# Patient Record
Sex: MALE | Race: WHITE | NOT HISPANIC OR LATINO | Employment: FULL TIME | URBAN - METROPOLITAN AREA
[De-identification: names, ages, dates, MRNs, and addresses within clinical notes are randomized per-mention and may not be internally consistent; named-entity substitution may affect disease eponyms.]

---

## 2017-05-02 ENCOUNTER — AMBULATORY - HEALTHEAST (OUTPATIENT)
Dept: FAMILY MEDICINE | Facility: CLINIC | Age: 37
End: 2017-05-02

## 2017-05-02 ENCOUNTER — RECORDS - HEALTHEAST (OUTPATIENT)
Dept: GENERAL RADIOLOGY | Facility: CLINIC | Age: 37
End: 2017-05-02

## 2017-05-02 ENCOUNTER — OFFICE VISIT - HEALTHEAST (OUTPATIENT)
Dept: FAMILY MEDICINE | Facility: CLINIC | Age: 37
End: 2017-05-02

## 2017-05-02 DIAGNOSIS — E66.9 DIABETES MELLITUS TYPE 2 IN OBESE: ICD-10-CM

## 2017-05-02 DIAGNOSIS — R10.32 LEFT LOWER QUADRANT PAIN: ICD-10-CM

## 2017-05-02 DIAGNOSIS — E11.69 DIABETES MELLITUS TYPE 2 IN OBESE: ICD-10-CM

## 2017-05-02 LAB — HBA1C MFR BLD: 10.8 % (ref 3.5–6.1)

## 2017-05-03 ENCOUNTER — AMBULATORY - HEALTHEAST (OUTPATIENT)
Dept: FAMILY MEDICINE | Facility: CLINIC | Age: 37
End: 2017-05-03

## 2017-05-03 DIAGNOSIS — Z79.899 MEDICATION MANAGEMENT: ICD-10-CM

## 2017-05-04 ENCOUNTER — COMMUNICATION - HEALTHEAST (OUTPATIENT)
Dept: FAMILY MEDICINE | Facility: CLINIC | Age: 37
End: 2017-05-04

## 2017-05-05 ENCOUNTER — AMBULATORY - HEALTHEAST (OUTPATIENT)
Dept: FAMILY MEDICINE | Facility: CLINIC | Age: 37
End: 2017-05-05

## 2017-05-05 DIAGNOSIS — E11.69 DIABETES MELLITUS TYPE 2 IN OBESE: ICD-10-CM

## 2017-05-05 DIAGNOSIS — E66.9 DIABETES MELLITUS TYPE 2 IN OBESE: ICD-10-CM

## 2017-05-16 ENCOUNTER — OFFICE VISIT - HEALTHEAST (OUTPATIENT)
Dept: NURSING | Facility: CLINIC | Age: 37
End: 2017-05-16

## 2017-05-16 DIAGNOSIS — E11.69 DIABETES MELLITUS TYPE 2 IN OBESE: ICD-10-CM

## 2017-05-16 DIAGNOSIS — S93.409S SPRAIN OF ANKLE, UNSPECIFIED LATERALITY, UNSPECIFIED LIGAMENT, SEQUELA: ICD-10-CM

## 2017-05-16 DIAGNOSIS — Z91.09 OTHER ALLERGY, OTHER THAN TO MEDICINAL AGENTS: ICD-10-CM

## 2017-05-16 DIAGNOSIS — E66.9 DIABETES MELLITUS TYPE 2 IN OBESE: ICD-10-CM

## 2017-05-16 RX ORDER — NAPROXEN SODIUM 220 MG
440 TABLET ORAL AT BEDTIME
Status: SHIPPED | COMMUNITY
Start: 2017-05-16 | End: 2022-06-21 | Stop reason: DRUGHIGH

## 2017-05-23 ENCOUNTER — AMBULATORY - HEALTHEAST (OUTPATIENT)
Dept: EDUCATION SERVICES | Facility: CLINIC | Age: 37
End: 2017-05-23

## 2017-05-24 ENCOUNTER — COMMUNICATION - HEALTHEAST (OUTPATIENT)
Dept: FAMILY MEDICINE | Facility: CLINIC | Age: 37
End: 2017-05-24

## 2017-05-31 ENCOUNTER — RECORDS - HEALTHEAST (OUTPATIENT)
Dept: GENERAL RADIOLOGY | Facility: CLINIC | Age: 37
End: 2017-05-31

## 2017-05-31 ENCOUNTER — OFFICE VISIT - HEALTHEAST (OUTPATIENT)
Dept: FAMILY MEDICINE | Facility: CLINIC | Age: 37
End: 2017-05-31

## 2017-05-31 ENCOUNTER — COMMUNICATION - HEALTHEAST (OUTPATIENT)
Dept: FAMILY MEDICINE | Facility: CLINIC | Age: 37
End: 2017-05-31

## 2017-05-31 DIAGNOSIS — S99.911A UNSPECIFIED INJURY OF RIGHT ANKLE, INITIAL ENCOUNTER: ICD-10-CM

## 2017-05-31 DIAGNOSIS — S99.911A ANKLE INJURY, RIGHT, INITIAL ENCOUNTER: ICD-10-CM

## 2017-06-07 ENCOUNTER — COMMUNICATION - HEALTHEAST (OUTPATIENT)
Dept: FAMILY MEDICINE | Facility: CLINIC | Age: 37
End: 2017-06-07

## 2017-06-07 DIAGNOSIS — E11.69 DIABETES MELLITUS TYPE 2 IN OBESE: ICD-10-CM

## 2017-06-07 DIAGNOSIS — E66.9 DIABETES MELLITUS TYPE 2 IN OBESE: ICD-10-CM

## 2017-06-07 RX ORDER — GLUCOSAMINE HCL/CHONDROITIN SU 500-400 MG
1 CAPSULE ORAL
Qty: 600 EACH | Refills: 3 | Status: SHIPPED | OUTPATIENT
Start: 2017-06-07

## 2017-06-09 ENCOUNTER — COMMUNICATION - HEALTHEAST (OUTPATIENT)
Dept: FAMILY MEDICINE | Facility: CLINIC | Age: 37
End: 2017-06-09

## 2017-06-26 ENCOUNTER — AMBULATORY - HEALTHEAST (OUTPATIENT)
Dept: EDUCATION SERVICES | Facility: CLINIC | Age: 37
End: 2017-06-26

## 2017-07-18 ENCOUNTER — COMMUNICATION - HEALTHEAST (OUTPATIENT)
Dept: FAMILY MEDICINE | Facility: CLINIC | Age: 37
End: 2017-07-18

## 2017-08-18 ENCOUNTER — RECORDS - HEALTHEAST (OUTPATIENT)
Dept: ADMINISTRATIVE | Facility: OTHER | Age: 37
End: 2017-08-18

## 2017-08-21 ENCOUNTER — OFFICE VISIT - HEALTHEAST (OUTPATIENT)
Dept: FAMILY MEDICINE | Facility: CLINIC | Age: 37
End: 2017-08-21

## 2017-08-21 ENCOUNTER — COMMUNICATION - HEALTHEAST (OUTPATIENT)
Dept: FAMILY MEDICINE | Facility: CLINIC | Age: 37
End: 2017-08-21

## 2017-08-21 DIAGNOSIS — H10.31 ACUTE CONJUNCTIVITIS OF RIGHT EYE, UNSPECIFIED ACUTE CONJUNCTIVITIS TYPE: ICD-10-CM

## 2017-12-19 ENCOUNTER — COMMUNICATION - HEALTHEAST (OUTPATIENT)
Dept: EDUCATION SERVICES | Facility: CLINIC | Age: 37
End: 2017-12-19

## 2018-01-15 ENCOUNTER — COMMUNICATION - HEALTHEAST (OUTPATIENT)
Dept: FAMILY MEDICINE | Facility: CLINIC | Age: 38
End: 2018-01-15

## 2018-01-21 ENCOUNTER — COMMUNICATION - HEALTHEAST (OUTPATIENT)
Dept: EDUCATION SERVICES | Facility: CLINIC | Age: 38
End: 2018-01-21

## 2018-01-22 ENCOUNTER — COMMUNICATION - HEALTHEAST (OUTPATIENT)
Dept: EDUCATION SERVICES | Facility: CLINIC | Age: 38
End: 2018-01-22

## 2018-02-13 ENCOUNTER — OFFICE VISIT - HEALTHEAST (OUTPATIENT)
Dept: FAMILY MEDICINE | Facility: CLINIC | Age: 38
End: 2018-02-13

## 2018-02-13 ENCOUNTER — COMMUNICATION - HEALTHEAST (OUTPATIENT)
Dept: TELEHEALTH | Facility: CLINIC | Age: 38
End: 2018-02-13

## 2018-02-13 LAB
ALBUMIN SERPL-MCNC: 4 G/DL (ref 3.5–5)
ALP SERPL-CCNC: 66 U/L (ref 45–120)
ALT SERPL W P-5'-P-CCNC: 77 U/L (ref 0–45)
ANION GAP SERPL CALCULATED.3IONS-SCNC: 9 MMOL/L (ref 5–18)
AST SERPL W P-5'-P-CCNC: 46 U/L (ref 0–40)
BILIRUB SERPL-MCNC: 1 MG/DL (ref 0–1)
BUN SERPL-MCNC: 18 MG/DL (ref 8–22)
CALCIUM SERPL-MCNC: 9.5 MG/DL (ref 8.5–10.5)
CHLORIDE BLD-SCNC: 104 MMOL/L (ref 98–107)
CHOLEST SERPL-MCNC: 189 MG/DL
CO2 SERPL-SCNC: 25 MMOL/L (ref 22–31)
CREAT SERPL-MCNC: 0.75 MG/DL (ref 0.7–1.3)
CREAT UR-MCNC: 241.4 MG/DL
FASTING STATUS PATIENT QL REPORTED: NO
GFR SERPL CREATININE-BSD FRML MDRD: >60 ML/MIN/1.73M2
GLUCOSE BLD-MCNC: 249 MG/DL (ref 70–125)
HBA1C MFR BLD: 11.1 % (ref 3.5–6.1)
HDLC SERPL-MCNC: 54 MG/DL
LDLC SERPL CALC-MCNC: 107 MG/DL
MICROALBUMIN UR-MCNC: 2.74 MG/DL (ref 0–1.99)
MICROALBUMIN/CREAT UR: 11.4 MG/G
POTASSIUM BLD-SCNC: 4.2 MMOL/L (ref 3.5–5)
PROT SERPL-MCNC: 6.7 G/DL (ref 6–8)
SODIUM SERPL-SCNC: 138 MMOL/L (ref 136–145)
TRIGL SERPL-MCNC: 141 MG/DL

## 2018-02-14 ENCOUNTER — COMMUNICATION - HEALTHEAST (OUTPATIENT)
Dept: FAMILY MEDICINE | Facility: CLINIC | Age: 38
End: 2018-02-14

## 2018-03-09 ENCOUNTER — OFFICE VISIT - HEALTHEAST (OUTPATIENT)
Dept: NURSING | Facility: CLINIC | Age: 38
End: 2018-03-09

## 2018-03-09 DIAGNOSIS — E66.9 DIABETES MELLITUS TYPE 2 IN OBESE: ICD-10-CM

## 2018-03-09 DIAGNOSIS — J30.2 SEASONAL ALLERGIC RHINITIS, UNSPECIFIED CHRONICITY, UNSPECIFIED TRIGGER: ICD-10-CM

## 2018-03-09 DIAGNOSIS — E11.69 DIABETES MELLITUS TYPE 2 IN OBESE: ICD-10-CM

## 2018-03-19 ENCOUNTER — RECORDS - HEALTHEAST (OUTPATIENT)
Dept: ADMINISTRATIVE | Facility: OTHER | Age: 38
End: 2018-03-19

## 2018-03-31 ENCOUNTER — COMMUNICATION - HEALTHEAST (OUTPATIENT)
Dept: FAMILY MEDICINE | Facility: CLINIC | Age: 38
End: 2018-03-31

## 2018-05-01 ENCOUNTER — COMMUNICATION - HEALTHEAST (OUTPATIENT)
Dept: FAMILY MEDICINE | Facility: CLINIC | Age: 38
End: 2018-05-01

## 2018-05-09 ENCOUNTER — COMMUNICATION - HEALTHEAST (OUTPATIENT)
Dept: PHARMACY | Facility: CLINIC | Age: 38
End: 2018-05-09

## 2018-05-18 ENCOUNTER — OFFICE VISIT - HEALTHEAST (OUTPATIENT)
Dept: PHARMACY | Facility: CLINIC | Age: 38
End: 2018-05-18

## 2018-05-18 ENCOUNTER — OFFICE VISIT - HEALTHEAST (OUTPATIENT)
Dept: FAMILY MEDICINE | Facility: CLINIC | Age: 38
End: 2018-05-18

## 2018-05-18 DIAGNOSIS — E11.9 DM2 (DIABETES MELLITUS, TYPE 2) (H): ICD-10-CM

## 2018-05-18 DIAGNOSIS — G47.33 OSA ON CPAP: ICD-10-CM

## 2018-05-18 DIAGNOSIS — E11.69 DIABETES MELLITUS TYPE 2 IN OBESE: ICD-10-CM

## 2018-05-18 DIAGNOSIS — J30.2 SEASONAL ALLERGIC RHINITIS, UNSPECIFIED CHRONICITY, UNSPECIFIED TRIGGER: ICD-10-CM

## 2018-05-18 DIAGNOSIS — E66.9 DIABETES MELLITUS TYPE 2 IN OBESE: ICD-10-CM

## 2018-05-18 LAB — HBA1C MFR BLD: 9.6 % (ref 3.5–6.1)

## 2018-05-19 ENCOUNTER — RECORDS - HEALTHEAST (OUTPATIENT)
Dept: ADMINISTRATIVE | Facility: OTHER | Age: 38
End: 2018-05-19

## 2018-05-21 LAB
ALBUMIN SERPL-MCNC: 4.2 G/DL (ref 3.5–5)
ALP SERPL-CCNC: 79 U/L (ref 45–120)
ALT SERPL W P-5'-P-CCNC: 78 U/L (ref 0–45)
ANION GAP SERPL CALCULATED.3IONS-SCNC: 13 MMOL/L (ref 5–18)
AST SERPL W P-5'-P-CCNC: 39 U/L (ref 0–40)
BILIRUB SERPL-MCNC: 1.2 MG/DL (ref 0–1)
BUN SERPL-MCNC: 17 MG/DL (ref 8–22)
CALCIUM SERPL-MCNC: 9.8 MG/DL (ref 8.5–10.5)
CHLORIDE BLD-SCNC: 103 MMOL/L (ref 98–107)
CHOLEST SERPL-MCNC: 183 MG/DL
CO2 SERPL-SCNC: 22 MMOL/L (ref 22–31)
CREAT SERPL-MCNC: 0.97 MG/DL (ref 0.7–1.3)
FASTING STATUS PATIENT QL REPORTED: ABNORMAL
GFR SERPL CREATININE-BSD FRML MDRD: >60 ML/MIN/1.73M2
GLUCOSE BLD-MCNC: 388 MG/DL (ref 70–125)
HDLC SERPL-MCNC: 55 MG/DL
LDLC SERPL CALC-MCNC: 84 MG/DL
POTASSIUM BLD-SCNC: 4.5 MMOL/L (ref 3.5–5)
PROT SERPL-MCNC: 6.7 G/DL (ref 6–8)
SODIUM SERPL-SCNC: 138 MMOL/L (ref 136–145)
TRIGL SERPL-MCNC: 219 MG/DL

## 2018-05-30 ENCOUNTER — COMMUNICATION - HEALTHEAST (OUTPATIENT)
Dept: FAMILY MEDICINE | Facility: CLINIC | Age: 38
End: 2018-05-30

## 2018-07-12 ENCOUNTER — TELEPHONE (OUTPATIENT)
Dept: OTHER | Facility: CLINIC | Age: 38
End: 2018-07-12

## 2018-07-25 ENCOUNTER — COMMUNICATION - HEALTHEAST (OUTPATIENT)
Dept: FAMILY MEDICINE | Facility: CLINIC | Age: 38
End: 2018-07-25

## 2018-08-28 ENCOUNTER — OFFICE VISIT - HEALTHEAST (OUTPATIENT)
Dept: PHARMACY | Facility: CLINIC | Age: 38
End: 2018-08-28

## 2018-08-28 ENCOUNTER — OFFICE VISIT - HEALTHEAST (OUTPATIENT)
Dept: FAMILY MEDICINE | Facility: CLINIC | Age: 38
End: 2018-08-28

## 2018-08-28 DIAGNOSIS — E11.69 DIABETES MELLITUS TYPE 2 IN OBESE: ICD-10-CM

## 2018-08-28 DIAGNOSIS — E66.9 DIABETES MELLITUS TYPE 2 IN OBESE: ICD-10-CM

## 2018-08-28 DIAGNOSIS — E55.9 VITAMIN D DEFICIENCY: ICD-10-CM

## 2018-08-28 LAB
ALBUMIN SERPL-MCNC: 3.9 G/DL (ref 3.5–5)
ALP SERPL-CCNC: 88 U/L (ref 45–120)
ALT SERPL W P-5'-P-CCNC: 58 U/L (ref 0–45)
ANION GAP SERPL CALCULATED.3IONS-SCNC: 11 MMOL/L (ref 5–18)
AST SERPL W P-5'-P-CCNC: 33 U/L (ref 0–40)
BILIRUB SERPL-MCNC: 0.7 MG/DL (ref 0–1)
BUN SERPL-MCNC: 15 MG/DL (ref 8–22)
CALCIUM SERPL-MCNC: 9.5 MG/DL (ref 8.5–10.5)
CHLORIDE BLD-SCNC: 104 MMOL/L (ref 98–107)
CO2 SERPL-SCNC: 23 MMOL/L (ref 22–31)
CREAT SERPL-MCNC: 0.83 MG/DL (ref 0.7–1.3)
CREAT UR-MCNC: 51.5 MG/DL
GFR SERPL CREATININE-BSD FRML MDRD: >60 ML/MIN/1.73M2
GLUCOSE BLD-MCNC: 384 MG/DL (ref 70–125)
HBA1C MFR BLD: 9 % (ref 3.5–6.1)
LDLC SERPL CALC-MCNC: 91 MG/DL
MICROALBUMIN UR-MCNC: <0.5 MG/DL (ref 0–1.99)
MICROALBUMIN/CREAT UR: NORMAL MG/G
POTASSIUM BLD-SCNC: 4.5 MMOL/L (ref 3.5–5)
PROT SERPL-MCNC: 6.3 G/DL (ref 6–8)
SODIUM SERPL-SCNC: 138 MMOL/L (ref 136–145)

## 2018-08-29 LAB
25(OH)D3 SERPL-MCNC: 15.2 NG/ML (ref 30–80)
25(OH)D3 SERPL-MCNC: 15.2 NG/ML (ref 30–80)

## 2018-11-04 ENCOUNTER — COMMUNICATION - HEALTHEAST (OUTPATIENT)
Dept: FAMILY MEDICINE | Facility: CLINIC | Age: 38
End: 2018-11-04

## 2018-12-07 ENCOUNTER — OFFICE VISIT - HEALTHEAST (OUTPATIENT)
Dept: PHARMACY | Facility: CLINIC | Age: 38
End: 2018-12-07

## 2018-12-07 ENCOUNTER — OFFICE VISIT - HEALTHEAST (OUTPATIENT)
Dept: FAMILY MEDICINE | Facility: CLINIC | Age: 38
End: 2018-12-07

## 2018-12-07 DIAGNOSIS — E66.9 DIABETES MELLITUS TYPE 2 IN OBESE: ICD-10-CM

## 2018-12-07 DIAGNOSIS — E11.69 DIABETES MELLITUS TYPE 2 IN OBESE: ICD-10-CM

## 2018-12-07 DIAGNOSIS — Z23 VACCINE FOR DIPHTHERIA-TETANUS: ICD-10-CM

## 2018-12-07 DIAGNOSIS — Z23 NEED FOR INFLUENZA VACCINATION: ICD-10-CM

## 2018-12-07 DIAGNOSIS — E66.01 CLASS 3 SEVERE OBESITY WITH SERIOUS COMORBIDITY AND BODY MASS INDEX (BMI) OF 45.0 TO 49.9 IN ADULT, UNSPECIFIED OBESITY TYPE (H): ICD-10-CM

## 2018-12-07 DIAGNOSIS — E66.813 CLASS 3 SEVERE OBESITY WITH SERIOUS COMORBIDITY AND BODY MASS INDEX (BMI) OF 45.0 TO 49.9 IN ADULT, UNSPECIFIED OBESITY TYPE (H): ICD-10-CM

## 2018-12-07 LAB
ALBUMIN SERPL-MCNC: 3.9 G/DL (ref 3.5–5)
ALP SERPL-CCNC: 92 U/L (ref 45–120)
ALT SERPL W P-5'-P-CCNC: 69 U/L (ref 0–45)
ANION GAP SERPL CALCULATED.3IONS-SCNC: 11 MMOL/L (ref 5–18)
AST SERPL W P-5'-P-CCNC: 42 U/L (ref 0–40)
BILIRUB SERPL-MCNC: 0.8 MG/DL (ref 0–1)
BUN SERPL-MCNC: 15 MG/DL (ref 8–22)
CALCIUM SERPL-MCNC: 9.3 MG/DL (ref 8.5–10.5)
CHLORIDE BLD-SCNC: 101 MMOL/L (ref 98–107)
CO2 SERPL-SCNC: 22 MMOL/L (ref 22–31)
CREAT SERPL-MCNC: 0.84 MG/DL (ref 0.7–1.3)
GFR SERPL CREATININE-BSD FRML MDRD: >60 ML/MIN/1.73M2
GLUCOSE BLD-MCNC: 309 MG/DL (ref 70–125)
HBA1C MFR BLD: 10.1 % (ref 3.5–6.1)
LDLC SERPL CALC-MCNC: 106 MG/DL
POTASSIUM BLD-SCNC: 4.3 MMOL/L (ref 3.5–5)
PROT SERPL-MCNC: 6.3 G/DL (ref 6–8)
SODIUM SERPL-SCNC: 134 MMOL/L (ref 136–145)

## 2019-02-25 ENCOUNTER — COMMUNICATION - HEALTHEAST (OUTPATIENT)
Dept: FAMILY MEDICINE | Facility: CLINIC | Age: 39
End: 2019-02-25

## 2019-02-25 DIAGNOSIS — E11.69 DIABETES MELLITUS TYPE 2 IN OBESE: ICD-10-CM

## 2019-02-25 DIAGNOSIS — E66.9 DIABETES MELLITUS TYPE 2 IN OBESE: ICD-10-CM

## 2019-02-25 RX ORDER — DULAGLUTIDE 1.5 MG/.5ML
INJECTION, SOLUTION SUBCUTANEOUS
Qty: 4 SYRINGE | Refills: 6 | Status: SHIPPED | OUTPATIENT
Start: 2019-02-25 | End: 2022-05-31

## 2019-03-08 ENCOUNTER — OFFICE VISIT - HEALTHEAST (OUTPATIENT)
Dept: FAMILY MEDICINE | Facility: CLINIC | Age: 39
End: 2019-03-08

## 2019-03-08 LAB
ALBUMIN SERPL-MCNC: 4.1 G/DL (ref 3.5–5)
ALP SERPL-CCNC: 73 U/L (ref 45–120)
ALT SERPL W P-5'-P-CCNC: 63 U/L (ref 0–45)
ANION GAP SERPL CALCULATED.3IONS-SCNC: 9 MMOL/L (ref 5–18)
AST SERPL W P-5'-P-CCNC: 37 U/L (ref 0–40)
BILIRUB SERPL-MCNC: 0.9 MG/DL (ref 0–1)
BUN SERPL-MCNC: 15 MG/DL (ref 8–22)
CALCIUM SERPL-MCNC: 9.4 MG/DL (ref 8.5–10.5)
CHLORIDE BLD-SCNC: 105 MMOL/L (ref 98–107)
CHOLEST SERPL-MCNC: 179 MG/DL
CO2 SERPL-SCNC: 22 MMOL/L (ref 22–31)
CREAT SERPL-MCNC: 0.81 MG/DL (ref 0.7–1.3)
CREAT UR-MCNC: 231.4 MG/DL
FASTING STATUS PATIENT QL REPORTED: YES
GFR SERPL CREATININE-BSD FRML MDRD: >60 ML/MIN/1.73M2
GLUCOSE BLD-MCNC: 284 MG/DL (ref 70–125)
HBA1C MFR BLD: 10.3 % (ref 3.5–6.1)
HDLC SERPL-MCNC: 53 MG/DL
LDLC SERPL CALC-MCNC: 107 MG/DL
MICROALBUMIN UR-MCNC: 2.29 MG/DL (ref 0–1.99)
MICROALBUMIN/CREAT UR: 9.9 MG/G
POTASSIUM BLD-SCNC: 4 MMOL/L (ref 3.5–5)
PROT SERPL-MCNC: 6.6 G/DL (ref 6–8)
SODIUM SERPL-SCNC: 136 MMOL/L (ref 136–145)
TRIGL SERPL-MCNC: 97 MG/DL

## 2019-09-03 ENCOUNTER — COMMUNICATION - HEALTHEAST (OUTPATIENT)
Dept: FAMILY MEDICINE | Facility: CLINIC | Age: 39
End: 2019-09-03

## 2019-09-03 ENCOUNTER — OFFICE VISIT - HEALTHEAST (OUTPATIENT)
Dept: FAMILY MEDICINE | Facility: CLINIC | Age: 39
End: 2019-09-03

## 2019-09-03 DIAGNOSIS — R07.89 CHEST WALL PAIN: ICD-10-CM

## 2019-09-03 DIAGNOSIS — E66.9 DIABETES MELLITUS TYPE 2 IN OBESE: ICD-10-CM

## 2019-09-03 DIAGNOSIS — E11.69 DIABETES MELLITUS TYPE 2 IN OBESE: ICD-10-CM

## 2019-09-03 DIAGNOSIS — E66.813 CLASS 3 SEVERE OBESITY WITH SERIOUS COMORBIDITY AND BODY MASS INDEX (BMI) OF 45.0 TO 49.9 IN ADULT, UNSPECIFIED OBESITY TYPE (H): ICD-10-CM

## 2019-09-03 DIAGNOSIS — E66.01 CLASS 3 SEVERE OBESITY WITH SERIOUS COMORBIDITY AND BODY MASS INDEX (BMI) OF 45.0 TO 49.9 IN ADULT, UNSPECIFIED OBESITY TYPE (H): ICD-10-CM

## 2019-09-03 DIAGNOSIS — R10.13 EPIGASTRIC PAIN: ICD-10-CM

## 2019-09-03 LAB
ANION GAP SERPL CALCULATED.3IONS-SCNC: 10 MMOL/L (ref 5–18)
BUN SERPL-MCNC: 13 MG/DL (ref 8–22)
CALCIUM SERPL-MCNC: 9.4 MG/DL (ref 8.5–10.5)
CHLORIDE BLD-SCNC: 102 MMOL/L (ref 98–107)
CO2 SERPL-SCNC: 24 MMOL/L (ref 22–31)
CREAT SERPL-MCNC: 0.79 MG/DL (ref 0.7–1.3)
GFR SERPL CREATININE-BSD FRML MDRD: >60 ML/MIN/1.73M2
GLUCOSE BLD-MCNC: 293 MG/DL (ref 70–125)
HBA1C MFR BLD: 10.5 % (ref 3.5–6)
POTASSIUM BLD-SCNC: 4.3 MMOL/L (ref 3.5–5)
SODIUM SERPL-SCNC: 136 MMOL/L (ref 136–145)

## 2019-09-04 LAB
ATRIAL RATE - MUSE: 65 BPM
DIASTOLIC BLOOD PRESSURE - MUSE: NORMAL MMHG
INTERPRETATION ECG - MUSE: NORMAL
P AXIS - MUSE: 36 DEGREES
PR INTERVAL - MUSE: 174 MS
QRS DURATION - MUSE: 102 MS
QT - MUSE: 400 MS
QTC - MUSE: 416 MS
R AXIS - MUSE: 2 DEGREES
SYSTOLIC BLOOD PRESSURE - MUSE: NORMAL MMHG
T AXIS - MUSE: 14 DEGREES
VENTRICULAR RATE- MUSE: 65 BPM

## 2019-09-05 ENCOUNTER — AMBULATORY - HEALTHEAST (OUTPATIENT)
Dept: FAMILY MEDICINE | Facility: CLINIC | Age: 39
End: 2019-09-05

## 2019-09-05 DIAGNOSIS — E11.69 DIABETES MELLITUS TYPE 2 IN OBESE: ICD-10-CM

## 2019-09-05 DIAGNOSIS — E66.9 DIABETES MELLITUS TYPE 2 IN OBESE: ICD-10-CM

## 2019-09-10 ENCOUNTER — COMMUNICATION - HEALTHEAST (OUTPATIENT)
Dept: ADMINISTRATIVE | Facility: CLINIC | Age: 39
End: 2019-09-10

## 2019-09-13 ENCOUNTER — OFFICE VISIT - HEALTHEAST (OUTPATIENT)
Dept: FAMILY MEDICINE | Facility: CLINIC | Age: 39
End: 2019-09-13

## 2019-09-13 DIAGNOSIS — R07.89 CHEST WALL PAIN: ICD-10-CM

## 2019-09-20 ENCOUNTER — OFFICE VISIT - HEALTHEAST (OUTPATIENT)
Dept: FAMILY MEDICINE | Facility: CLINIC | Age: 39
End: 2019-09-20

## 2019-09-20 ENCOUNTER — HOSPITAL ENCOUNTER (OUTPATIENT)
Dept: ULTRASOUND IMAGING | Facility: CLINIC | Age: 39
Discharge: HOME | End: 2019-09-20
Attending: FAMILY MEDICINE

## 2019-09-20 DIAGNOSIS — G47.33 OSA ON CPAP: ICD-10-CM

## 2019-09-20 DIAGNOSIS — N50.812 TESTICULAR PAIN, LEFT: ICD-10-CM

## 2019-09-20 RX ORDER — ACETAMINOPHEN AND CODEINE PHOSPHATE 300; 30 MG/1; MG/1
1 TABLET ORAL 2 TIMES DAILY PRN
Qty: 20 TABLET | Refills: 0 | Status: SHIPPED | OUTPATIENT
Start: 2019-09-20 | End: 2022-06-06

## 2019-09-25 ENCOUNTER — COMMUNICATION - HEALTHEAST (OUTPATIENT)
Dept: FAMILY MEDICINE | Facility: CLINIC | Age: 39
End: 2019-09-25

## 2019-09-25 DIAGNOSIS — R10.13 EPIGASTRIC PAIN: ICD-10-CM

## 2019-09-26 ENCOUNTER — DOCUMENTATION ONLY (OUTPATIENT)
Dept: CARE COORDINATION | Facility: CLINIC | Age: 39
End: 2019-09-26

## 2019-09-26 NOTE — TELEPHONE ENCOUNTER
RECORDS RECEIVED FROM: [facility]   DATE RECEIVED: 11/20/2019   NOTES (FOR ALL VISITS) STATUS DETAILS   OFFICE NOTES from referring provider Care Everywhere 09/13/2019   OFFICE NOTES from other specialist N/A    ED NOTES N/A    OPERATIVE REPORT  (thyroid, pituitary, adrenal, parathyroid) N/A    MEDICATION LIST Care Everywhere MetFORMIN   IMAGING      DEXASCAN N/A    MRI (BRAIN) N/A    XR (Chest) N/A    CT (HEAD/NECK/CHEST/ABDOMEN) N/A    NUCLEAR  N/A    ULTRASOUND (HEAD/NECK) N/A    LABS     DIABETES: HBGA1C, CREATININE, FASTING LIPIDS, MICROALBUMIN URINE, POTASSIUM, TSH, T4    THYROID: TSH, T4, CBC, THYRODLONULIN, TOTAL T3, FREE T4, CALCITONIN, CEA Care Everywhere   09/03/2019 03/08/2019 08/28/2018

## 2019-10-11 ENCOUNTER — COMMUNICATION - HEALTHEAST (OUTPATIENT)
Dept: FAMILY MEDICINE | Facility: CLINIC | Age: 39
End: 2019-10-11

## 2019-10-11 DIAGNOSIS — R10.13 EPIGASTRIC PAIN: ICD-10-CM

## 2019-11-20 ENCOUNTER — PRE VISIT (OUTPATIENT)
Dept: ENDOCRINOLOGY | Facility: CLINIC | Age: 39
End: 2019-11-20

## 2019-12-02 ENCOUNTER — COMMUNICATION - HEALTHEAST (OUTPATIENT)
Dept: FAMILY MEDICINE | Facility: CLINIC | Age: 39
End: 2019-12-02

## 2020-03-18 ENCOUNTER — COMMUNICATION - HEALTHEAST (OUTPATIENT)
Dept: FAMILY MEDICINE | Facility: CLINIC | Age: 40
End: 2020-03-18

## 2020-03-19 RX ORDER — METFORMIN HCL 500 MG
1000 TABLET, EXTENDED RELEASE 24 HR ORAL 2 TIMES DAILY
Qty: 360 TABLET | Refills: 3 | Status: SHIPPED | OUTPATIENT
Start: 2020-03-19 | End: 2022-06-06

## 2020-10-17 ENCOUNTER — AMBULATORY - HEALTHEAST (OUTPATIENT)
Dept: NURSING | Facility: CLINIC | Age: 40
End: 2020-10-17

## 2021-05-30 VITALS — WEIGHT: 315 LBS

## 2021-05-31 VITALS — WEIGHT: 315 LBS

## 2021-05-31 NOTE — PROGRESS NOTES
Assessment/Plan:        Diagnoses and all orders for this visit:    Chest wall pain  -     Electrocardiogram Perform and Read  -     XR Chest 2 Views  -     acetaminophen-codeine (TYLENOL #3) 300-30 mg per tablet; Take 1 tablet by mouth 2 (two) times a day as needed for pain.  Dispense: 20 tablet; Refill: 0    Diabetes mellitus type 2 in obese (H)  -     Glycosylated Hemoglobin A1C  -     Basic Metabolic Panel    Class 3 severe obesity with serious comorbidity and body mass index (BMI) of 45.0 to 49.9 in adult, unspecified obesity type (H)    Epigastric pain  -     omeprazole (PRILOSEC) 20 MG capsule; Take 1 capsule (20 mg total) by mouth daily before breakfast.  Dispense: 30 capsule; Refill: 0  Following the examination as well as the history, I do think that what he has is musculoskeletal in origin though I am unable to reproduce the pain.  I did do an EKG because of his fear of ACS which was essentially normal.  I also did a chest x-ray that was also reported as being normal.  I have discussed with him regarding the differential diagnosis.  I think he does have epigastric pain from likely gastritis and advised for him to take some omeprazole.  He does not think he has that.  I given him a prescription for Tylenol 3 to take twice a day as needed hopefully that will lessen the pain.  He knows to call or come in if there is no improvement.  His questions were answered.  He will follow-up as needed.        Subjective:    Patient ID: Mohsen Morales is a 39 y.o. male.    39-year-old gentleman who comes in today with concerns of having chest pain that is noted on the right lateral aspect of the upper chest.  He also noticed some pain starting around the epigastric region.  He had noted no reflux esophagitis and does not think that he has heartburn.  That the pain has started a day or 2 [place]  He noted that he was not very dehydrated and must have had some dehydration.  He does think that he had  dehydration.  Following that he started having aches and pain without chills which made him think that he is having flu.  He started having pain in the chest, aches and pain on the joints and limbs.  There is some associated shortness of breath and inability to take deep breaths because of the pain in the chest.  He noted no diaphoresis, denied having any palpitations.  He does not have any sore throat and no upper respiratory tract infections.  He has been increasing his fluid intake but is worried about cardiovascular disease and wanted to come in to get it checked.  He does have a history of uncontrolled diabetes mellitus type II    The following portions of the patient's history were reviewed and updated as appropriate: allergies, current medications, past family history, past medical history, past social history, past surgical history and problem list.    Review of Systems   Constitutional: Positive for fatigue. Negative for chills, diaphoresis and fever.   HENT: Negative.    Respiratory: Negative for cough, chest tightness, shortness of breath and wheezing.    Cardiovascular: Positive for chest pain. Negative for palpitations and leg swelling.   Musculoskeletal: Positive for arthralgias and myalgias.   Neurological: Positive for headaches. Negative for dizziness and light-headedness.     Vitals:    09/03/19 1125   BP: 132/72   Pulse: 77   Resp: 22   SpO2: 98%   Weight: (!) 344 lb 3 oz (156.1 kg)             Objective:    Physical Exam   Constitutional: He is oriented to person, place, and time. He appears well-developed and well-nourished. No distress.   He is afebrile to touch and not pale and is worried appearing.  He is morbidly obese.   HENT:   Mouth/Throat: No oropharyngeal exudate.   Neck: No thyromegaly present.   Cardiovascular: Normal rate and regular rhythm.   Pulmonary/Chest: Effort normal and breath sounds normal.   Palpation of the chest wall as well as compression did not reveal any tenderness.   But when he takes a deep breath he notices some pain.   Abdominal: Soft.   Musculoskeletal: Normal range of motion.   Lymphadenopathy:     He has no cervical adenopathy.   Neurological: He is alert and oriented to person, place, and time.

## 2021-05-31 NOTE — TELEPHONE ENCOUNTER
Question following Office Visit  When did you see your provider: 9/3/19  What is your question:   Patient states Provider was going to send a prescription for Tylenol with codeine today to [facility] in [id number].  Pharmacy does not have it yet.  Patient would like to  today.  Okay to leave a detailed message: Yes

## 2021-06-01 ENCOUNTER — RECORDS - HEALTHEAST (OUTPATIENT)
Dept: ADMINISTRATIVE | Facility: CLINIC | Age: 41
End: 2021-06-01

## 2021-06-01 VITALS — WEIGHT: 315 LBS

## 2021-06-01 NOTE — PROGRESS NOTES
ASSESSMENT:  1. Chest wall pain    2. Uncontrolled type 2 diabetes mellitus without complication, without long-term current use of insulin (H)  - Ambulatory referral to Endocrinology  Reassured him at this time that he is doing better and better.  I still do not think that he is having ACS, I do not think that he is having any lung problems.  I think that he is still having chest wall pain and hope that with consistent management and rest and possibly pulmonary therapy he will be feeling better.  Discussed diabetes mellitus, the fact that he is not doing quite well at this time.  I encouraged him to try to change and be as active as he can and also try to do the referral that he had been given.    PLAN:  There are no Patient Instructions on file for this visit.    Orders Placed This Encounter   Procedures     Ambulatory referral to Endocrinology     Referral Priority:   Routine     Referral Type:   Consultation     Referral Reason:   Evaluation and Treatment     Requested Specialty:   Endocrinology     Number of Visits Requested:   1     There are no discontinued medications.    No follow-ups on file.      CHIEF COMPLAINT:  Chief Complaint   Patient presents with     Follow-up     Chest Pain     Breathing Problem       HISTORY OF PRESENT ILLNESS:  Mohsen is a 39 y.o. male presenting to the clinic today he is a following up for a previous visit of chest wall pain as well as breathing problems that he had complained of having following going to the [place] state fair.  He was treated at the time that he was seen, he notes that he is doing better though still has some pain but not as much as previously.  He denies having any nausea or vomiting at this time.  He still has to use the pain medication to help with the pain.  Last time he was seen he did have labs that was done for him.  The lab did show that he has a high A1c.  We discussed several different occasions and visits management of his diabetes.  Unfortunately  his control remained very bad.  His last A1c was 10.5% which was about 0.2% higher than what it was in the past.  I had referred him to [facility] but he does not want to go to the [facility] and wanted to have a different referral put in.    REVIEW OF SYSTEMS:   Still feels slightly fatigued, but doing better.  No exertional chest pain, no palpitations.  No swelling to lower extremities.  All other systems are negative.    PFSH:  Reviewed, as below.    Social History     Tobacco Use   Smoking Status Never Smoker   Smokeless Tobacco Never Used       Family History   Problem Relation Age of Onset     Hypertension Mother      Diabetes Paternal Uncle        Social History     Socioeconomic History     Marital status: Single     Spouse name: Not on file     Number of children: Not on file     Years of education: Not on file     Highest education level: Not on file   Occupational History     Not on file   Social Needs     Financial resource strain: Not on file     Food insecurity:     Worry: Not on file     Inability: Not on file     Transportation needs:     Medical: Not on file     Non-medical: Not on file   Tobacco Use     Smoking status: Never Smoker     Smokeless tobacco: Never Used   Substance and Sexual Activity     Alcohol use: Not on file     Drug use: Not on file     Sexual activity: Not on file   Lifestyle     Physical activity:     Days per week: Not on file     Minutes per session: Not on file     Stress: Not on file   Relationships     Social connections:     Talks on phone: Not on file     Gets together: Not on file     Attends Shinto service: Not on file     Active member of club or organization: Not on file     Attends meetings of clubs or organizations: Not on file     Relationship status: Not on file     Intimate partner violence:     Fear of current or ex partner: Not on file     Emotionally abused: Not on file     Physically abused: Not on file     Forced sexual activity: Not on file   Other Topics  Concern     Not on file   Social History Narrative     Not on file       No past surgical history on file.    Allergies   Allergen Reactions     Amoxicillin Other (See Comments)     Nauseated, upset stomach     Glipizide Nausea And Vomiting     Jardiance [Empagliflozin] Other (See Comments)     dehydated     Penicillins        Active Ambulatory Problems     Diagnosis Date Noted     Class 3 severe obesity with serious comorbidity and body mass index (BMI) of 45.0 to 49.9 in adult, unspecified obesity type (H)      Obstructive sleep apnea      Vitamin D Deficiency      Allergies      Diabetes mellitus type 2 in obese (H)      Ankle Sprain      Resolved Ambulatory Problems     Diagnosis Date Noted     No Resolved Ambulatory Problems     No Additional Past Medical History       Current Outpatient Medications   Medication Sig Dispense Refill     acetaminophen-codeine (TYLENOL #3) 300-30 mg per tablet Take 1 tablet by mouth 2 (two) times a day as needed for pain. 20 tablet 0     blood glucose test (ONETOUCH ULTRA TEST) strips Use 1 each As Directed 6 (six) times a day. 600 each 3     fexofenadine (ALLEGRA) 180 MG tablet Take 180 mg by mouth daily. For allergies       lancets (ONETOUCH DELICA LANCETS) 30 gauge Misc Inject 1 each under the skin 4 (four) times a day. 300 each 3     metFORMIN (GLUCOPHAGE-XR) 500 MG 24 hr tablet Take 2 tablets (1,000 mg total) by mouth 2 (two) times a day. 360 tablet 2     multivitamin therapeutic tablet Take 1 tablet by mouth daily.       naproxen sodium (ALEVE) 220 MG tablet Take 440 mg by mouth at bedtime.       omeprazole (PRILOSEC) 20 MG capsule Take 1 capsule (20 mg total) by mouth daily before breakfast. 30 capsule 0     TRULICITY 1.5 mg/0.5 mL PnIj INJECT 1.5 MG UNDER THE SKIN EVERY 7 DAYS. 4 Syringe 6     No current facility-administered medications for this visit.        VITALS:  Vitals:    09/13/19 1324   BP: 138/78   Pulse: 82   Resp: 22   SpO2: 97%   Weight: (!) 349 lb (158.3 kg)      Wt Readings from Last 3 Encounters:   09/13/19 (!) 349 lb (158.3 kg)   09/03/19 (!) 344 lb 3 oz (156.1 kg)   03/08/19 (!) 355 lb 8 oz (161.3 kg)     Body mass index is 46.04 kg/m .    PHYSICAL EXAM:  General Appearance: Alert, cooperative, no distress, appears stated age and morbidly obese.  HEENT: Pupils are equal and reactive, extraocular motions is normal. Neck is supple no notable thyromegaly.  External ears are normal.  Lungs: He is respiration is not labored.  Heart: He does have normal peripheral pulsation.  Abdomen: Soft  Musculoskeletal: Normal range of motion.  Neurologic:  Alert and oriented times 3.   Psychiatric: Normal mood and affect.    MEDICATIONS:  Current Outpatient Medications   Medication Sig Dispense Refill     acetaminophen-codeine (TYLENOL #3) 300-30 mg per tablet Take 1 tablet by mouth 2 (two) times a day as needed for pain. 20 tablet 0     blood glucose test (ONETOUCH ULTRA TEST) strips Use 1 each As Directed 6 (six) times a day. 600 each 3     fexofenadine (ALLEGRA) 180 MG tablet Take 180 mg by mouth daily. For allergies       lancets (ONETOUCH DELICA LANCETS) 30 gauge Misc Inject 1 each under the skin 4 (four) times a day. 300 each 3     metFORMIN (GLUCOPHAGE-XR) 500 MG 24 hr tablet Take 2 tablets (1,000 mg total) by mouth 2 (two) times a day. 360 tablet 2     multivitamin therapeutic tablet Take 1 tablet by mouth daily.       naproxen sodium (ALEVE) 220 MG tablet Take 440 mg by mouth at bedtime.       omeprazole (PRILOSEC) 20 MG capsule Take 1 capsule (20 mg total) by mouth daily before breakfast. 30 capsule 0     TRULICITY 1.5 mg/0.5 mL PnIj INJECT 1.5 MG UNDER THE SKIN EVERY 7 DAYS. 4 Syringe 6     No current facility-administered medications for this visit.

## 2021-06-01 NOTE — PROGRESS NOTES
Assessment/Plan:        Diagnoses and all orders for this visit:    Testicular pain, left  -     doxycycline (VIBRA-TABS) 100 MG tablet; Take 1 tablet (100 mg total) by mouth 2 (two) times a day for 10 days.  Dispense: 20 tablet; Refill: 0  -     US Scrotum and Testicles W Duplex Ltd; Future; Expected date: 09/20/2019  -     acetaminophen-codeine (TYLENOL #3) 300-30 mg per tablet; Take 1 tablet by mouth 2 (two) times a day as needed for pain.  Dispense: 20 tablet; Refill: 0    ARY on CPAP  Discussed the symptoms that he has, he has had no prior history of STD and is .  I did discuss information as against infection of the testicle or epididymis.  He is to get an ultrasound, and I did start him on doxycycline.Will await the ultrasound report.         Subjective:    Patient ID: Mohsen Morales is a 39 y.o. male.     Testicle Pain    The patient's primary symptoms include testicular pain. The patient's pertinent negatives include no scrotal swelling. This is a new problem. The current episode started in the past 7 days (This today within the past 3 days.). The problem occurs constantly. The problem has been unchanged. The pain is medium. Pertinent negatives include no abdominal pain, anorexia, chills, constipation, dysuria, flank pain or headaches. Associated symptoms comments: He is now noticing some groin pain as well and this is also on the left side.  Noted touching the leg will cause worsening pain.. The testicular pain affects the left testicle. There is swelling in the left testicle. The color of the testicles is normal. The symptoms are aggravated by tactile pressure and activity. He has tried a cold pack for the symptoms. The treatment provided no relief. He is sexually active. It is unknown whether or not his partner has an STD. There is no history of chlamydia, gonorrhea or prostatitis.       The following portions of the patient's history were reviewed and updated as appropriate: allergies, current  medications, past family history, past medical history, past social history, past surgical history and problem list.    Review of Systems   Constitutional: Negative for chills.   Gastrointestinal: Negative for abdominal pain, anorexia and constipation.   Genitourinary: Positive for testicular pain. Negative for dysuria, flank pain and scrotal swelling.   Musculoskeletal: Negative for back pain.   Neurological: Negative for headaches.     Vitals:    09/20/19 1113   BP: 132/88   Pulse: 77   Resp: 20   SpO2: 95%   Weight: (!) 342 lb (155.1 kg)             Objective:    Physical Exam   Constitutional: He appears well-developed and well-nourished. No distress.   He is worried but not distressed.   Abdominal: Soft. There is no tenderness. Hernia confirmed negative in the left inguinal area.   Abdomen is obese.   Genitourinary: Penis normal. Right testis shows no tenderness. Left testis shows swelling and tenderness.   Neurological: He is alert.   Skin: Skin is warm.

## 2021-06-02 VITALS — WEIGHT: 315 LBS

## 2021-06-02 NOTE — TELEPHONE ENCOUNTER
Medication Question or Clarification  Who is calling: Pharmacy: [facility] #[id number]  What medication are you calling about? (include dose and sig) Omeprazole 20 mg, one capsule by mouth daily before breakfast  Who prescribed the medication?:Ada Guzman MD   What is your question/concern?: Could patient have a 90 day supply?  Pharmacy: [facility] #[id number]  Okay to leave a detailed message?: Yes  Site CMT - Please call the pharmacy to obtain any additional needed information.

## 2021-06-03 VITALS
RESPIRATION RATE: 22 BRPM | HEART RATE: 77 BPM | WEIGHT: 315 LBS | OXYGEN SATURATION: 98 % | DIASTOLIC BLOOD PRESSURE: 72 MMHG | SYSTOLIC BLOOD PRESSURE: 132 MMHG

## 2021-06-03 VITALS
OXYGEN SATURATION: 95 % | RESPIRATION RATE: 20 BRPM | WEIGHT: 315 LBS | SYSTOLIC BLOOD PRESSURE: 132 MMHG | HEART RATE: 77 BPM | DIASTOLIC BLOOD PRESSURE: 88 MMHG

## 2021-06-03 VITALS
HEART RATE: 82 BPM | OXYGEN SATURATION: 97 % | WEIGHT: 315 LBS | DIASTOLIC BLOOD PRESSURE: 78 MMHG | RESPIRATION RATE: 22 BRPM | SYSTOLIC BLOOD PRESSURE: 138 MMHG

## 2021-06-03 NOTE — TELEPHONE ENCOUNTER
Refill Approved    Rx renewed per Medication Renewal Policy. Medication was last renewed on 3/8/19.    Radha Bhatia, [facility] Triage/Med Refill 12/2/2019     Requested Prescriptions   Pending Prescriptions Disp Refills     metFORMIN (GLUCOPHAGE-XR) 500 MG 24 hr tablet [Pharmacy Med Name: METFORMIN HCL  MG TABLET] 360 tablet 2     Sig: TAKE 2 TABLETS BY MOUTH TWICE A DAY       Metformin Refill Protocol Passed - 12/2/2019  2:42 AM        Passed - Blood pressure in last 12 months     BP Readings from Last 1 Encounters:   09/20/19 132/88             Passed - LFT or AST or ALT in last 12 months     Albumin   Date Value Ref Range Status   03/08/2019 4.1 3.5 - 5.0 g/dL Final     Bilirubin, Total   Date Value Ref Range Status   03/08/2019 0.9 0.0 - 1.0 mg/dL Final     Bilirubin, Direct   Date Value Ref Range Status   04/20/2010 0.24 <0.31 mg/dL Final     Alkaline Phosphatase   Date Value Ref Range Status   03/08/2019 73 45 - 120 U/L Final     AST   Date Value Ref Range Status   03/08/2019 37 0 - 40 U/L Final     ALT   Date Value Ref Range Status   03/08/2019 63 (H) 0 - 45 U/L Final     Protein, Total   Date Value Ref Range Status   03/08/2019 6.6 6.0 - 8.0 g/dL Final                Passed - GFR or Serum Creatinine in last 6 months     GFR MDRD Non Af Amer   Date Value Ref Range Status   09/03/2019 >60 >60 mL/min/1.73m2 Final     GFR MDRD Af Amer   Date Value Ref Range Status   09/03/2019 >60 >60 mL/min/1.73m2 Final             Passed - Visit with PCP or prescribing provider visit in last 6 months or next 3 months     Last office visit with prescriber/PCP: 9/20/2019 OR same dept: 9/20/2019 Ada Guzman MD OR same specialty: 9/20/2019 Ada Guzman MD Last physical: Visit date not found Last MTM visit: Visit date not found         Next appt within 3 mo: Visit date not found  Next physical within 3 mo: Visit date not found  Prescriber OR PCP: Ada Guzman MD  Last  diagnosis associated with med order: 1. Uncontrolled type 2 diabetes mellitus without complication, without long-term current use of insulin (H)  - metFORMIN (GLUCOPHAGE-XR) 500 MG 24 hr tablet [Pharmacy Med Name: METFORMIN HCL  MG TABLET]; TAKE 2 TABLETS BY MOUTH TWICE A DAY  Dispense: 360 tablet; Refill: 2     If protocol passes may refill for 12 months if within 3 months of last provider visit (or a total of 15 months).           Passed - A1C in last 6 months     Hemoglobin A1c   Date Value Ref Range Status   09/03/2019 10.5 (H) 3.5 - 6.0 % Final               Passed - Microalbumin in last year      Microalbumin, Random Urine   Date Value Ref Range Status   03/08/2019 2.29 (H) 0.00 - 1.99 mg/dL Final

## 2021-06-07 NOTE — TELEPHONE ENCOUNTER
RN cannot approve Refill Request    RN can NOT refill this medication Protocol failed and NO refill given.      Radha Bhatia, [facility] Triage/Med Refill 3/18/2020    Requested Prescriptions   Pending Prescriptions Disp Refills     metFORMIN (GLUCOPHAGE-XR) 500 MG 24 hr tablet 360 tablet 3     Sig: Take 2 tablets (1,000 mg total) by mouth 2 (two) times a day.       Metformin Refill Protocol Failed - 3/18/2020  3:21 PM        Failed - LFT or AST or ALT in last 12 months     Albumin   Date Value Ref Range Status   03/08/2019 4.1 3.5 - 5.0 g/dL Final     Bilirubin, Total   Date Value Ref Range Status   03/08/2019 0.9 0.0 - 1.0 mg/dL Final     Bilirubin, Direct   Date Value Ref Range Status   04/20/2010 0.24 <0.31 mg/dL Final     Alkaline Phosphatase   Date Value Ref Range Status   03/08/2019 73 45 - 120 U/L Final     AST   Date Value Ref Range Status   03/08/2019 37 0 - 40 U/L Final     ALT   Date Value Ref Range Status   03/08/2019 63 (H) 0 - 45 U/L Final     Protein, Total   Date Value Ref Range Status   03/08/2019 6.6 6.0 - 8.0 g/dL Final                Failed - Visit with PCP or prescribing provider visit in last 6 months or next 3 months     Last office visit with prescriber/PCP: 9/20/2019 OR same dept: 9/20/2019 Ada Guzman MD OR same specialty: 9/20/2019 Ada Guzman MD Last physical: Visit date not found Last MTM visit: Visit date not found         Next appt within 3 mo: Visit date not found  Next physical within 3 mo: Visit date not found  Prescriber OR PCP: Ada Guzman MD  Last diagnosis associated with med order: 1. Uncontrolled type 2 diabetes mellitus without complication, without long-term current use of insulin (H)  - metFORMIN (GLUCOPHAGE-XR) 500 MG 24 hr tablet; Take 2 tablets (1,000 mg total) by mouth 2 (two) times a day.  Dispense: 360 tablet; Refill: 0     If protocol passes may refill for 12 months if within 3 months of last provider visit (or  a total of 15 months).           Failed - A1C in last 6 months     Hemoglobin A1c   Date Value Ref Range Status   09/03/2019 10.5 (H) 3.5 - 6.0 % Final               Failed - Microalbumin in last year      Microalbumin, Random Urine   Date Value Ref Range Status   03/08/2019 2.29 (H) 0.00 - 1.99 mg/dL Final                  Passed - Blood pressure in last 12 months     BP Readings from Last 1 Encounters:   09/20/19 132/88             Passed - GFR or Serum Creatinine in last 6 months     GFR MDRD Non Af Amer   Date Value Ref Range Status   09/03/2019 >60 >60 mL/min/1.73m2 Final     GFR MDRD Af Amer   Date Value Ref Range Status   09/03/2019 >60 >60 mL/min/1.73m2 Final

## 2021-06-10 NOTE — PROGRESS NOTES
ASSESSMENT:  1. Diabetes mellitus type 2 in obese  - Glycosylated Hemoglobin A1c  - Basic Metabolic Panel  - blood glucose test (ONETOUCH ULTRA TEST) strips; Use 1 each As Directed as needed. Up to 3 - 4 times daily  Dispense: 100 each; Refill: 0  - generic lancets (ONETOUCH ULTRASOFT); Use 1 each As Directed as needed. By Physician or test up to 4 times daily  Dispense: 100 each; Refill: 1  - blood glucose meter (GLUCOMETER); Use 1 each As Directed as needed. Dispense glucometer brand per patient's insurance at pharmacy discretion.  Dispense: 1 each; Refill: 0  - Ambulatory referral to Diabetic Education    2. Left lower quadrant pain  - XR Abdomen Flat and Upright; Future      PLAN:  There are no Patient Instructions on file for this visit.    Orders Placed This Encounter   Procedures     XR Abdomen Flat and Upright     Standing Status:   Future     Number of Occurrences:   1     Standing Expiration Date:   5/2/2018     Order Specific Question:   Can the procedure be changed per Radiologist protocol?     Answer:   Yes     Glycosylated Hemoglobin A1c     Basic Metabolic Panel     Ambulatory referral to Diabetic Education     Referral Priority:   Routine     Referral Type:   Consultation     Referral Reason:   Evaluation and Treatment     Requested Specialty:   Endocrinology     Number of Visits Requested:   1     Medications Discontinued During This Encounter   Medication Reason     blood sugar diagnostic (ONE TOUCH ULTRA TEST) Strp Reorder     ONE TOUCH ULTRASOFT LANCETS MISC Reorder       No Follow-up on file.   Tried to deal with the Abdominal pain and Diabetes Mellitus today.  Advised patient to use eye drops for eye drainage as I think they appears to be allergy related. Ordered an Abdominal X-ray for LLQ pain; will await Radiologist read. For diabetes, referred patient to Diabetes Education and ordered a new glucometer and testing supplies. Will get labs today as well to see where he is at with the control. .      CHIEF COMPLAINT:  Chief Complaint   Patient presents with     Eye Drainage     bilateral eyes crusty in the mornings, son has pink eye- pt states he has cough/cold sx x 2 weeks     Abdominal Pain     left sided lower abdominal pain since Thursday ? muscle pull     Chest Pain     mid chest pain episodes off and on x 2-3 years, more prominent when congestion and allergies are flaring up     Decreased Appetitie     decreased appetite since Sat, starts eating and gets Acid Reflux- has had increased fatigue     Nail Problem     noticed today that has red color along each toenail     Diabetes     pt hasn't been checking blood sugars for 1 year, would like rx for new meter and testing supplies       HISTORY OF PRESENT ILLNESS:  Mohsen is a 37 y.o. male presenting to the clinic today for evaluation of cough/eye drainage and abdominal pain. He has a number of concerns but i counseled he needs to come back another day to be able to tackle the rest.    Cough/Eye Drainage: He has had cold symptoms for about 2 weeks including cough and nasal drainage. He does have seasonal allergies, and he has been taking Allegra every night. He has chest pain with cough. He has had chest pain on-and-off over the last 2 or 3 years, and the chest pain is worse when he feels congested and when his allergies flare up. His eyes have been crusty in the mornings. He does not have crusty eyes during the day. He denies facial pain. Of note, his son had pink eye recently and was treated last week.     Abdominal Pain: He has experienced a nauseating discomfort on the left side of his abdomen intermittently since April 27th. The discomfort feels as if he pulled a muscle. The discomfort is not sharp and does not radiate. He only experiences the nausea and discomfort with movement. He notices the nausea and discomfort more when he eats. He can feel the nausea more when he moves around after eating even half of what he normally eats. As a result, he has  had a decreased appetite. He does have acid reflux occasionally, but he denies heartburn. He has experienced acid reflux 1 or 2 times over the last week. He can sometimes feel discomfort on the right side of his abdomen, but not often. He has used Aleve for symptoms without much improvement. Today, he notes improvement in the abdominal symptoms compared to the last few days. He denies constipation, hard stool, and blood in stool. He has normal bowel movements.     Diabetes: He has never taken medication for diabetes. He has been watching his diet. He has not seen a dietician. He has not checked his blood glucose levels for a year. His fasting blood sugars used to be around 77 when he used to check. His 1/7/2015 A1c lab was 6.8%. He would like a prescription for a new glucometer today.     Sleep Apnea: He sleeps with a CPAP. He would like a letter written for work stating that he needs a single room when he travels for work because of sleep apnea.    REVIEW OF SYSTEMS:   Comprehensive review of systems negative except as noted above.    PFSH:  Reviewed, as below.     TOBACCO USE:  History   Smoking Status     Never Smoker   Smokeless Tobacco     Not on file       VITALS:  Vitals:    05/02/17 1155 05/02/17 1228   BP: 154/80 138/80   Pulse: 68    Weight: (!) 359 lb 12.8 oz (163.2 kg)      Wt Readings from Last 3 Encounters:   05/02/17 (!) 359 lb 12.8 oz (163.2 kg)   01/07/15 (!) 382 lb (173.3 kg)     Body mass index is 47.47 kg/(m^2).    PHYSICAL EXAM:  General Appearance: Alert, cooperative, no distress, appears stated age  Head: Normocephalic, without obvious abnormality, atraumatic. No sinus tenderness.   Eyes: Pupils equal, symmetric  Ears: Normal TMs and external ear canals, both ears  Nose: Nares normal, septum midline, mucosa normal  Throat: Postnasal drip noted.   Neck: Supple, symmetrical, trachea midline, no adenopathy;  thyroid: not enlarged, symmetric, no tenderness/mass/nodules  Lungs: Clear to  auscultation bilaterally, respirations unlabored  Heart: Regular rate and rhythm, S1 and S2 normal, no murmur, rub, or gallop  Abdomen: Obese. Soft, bowel sounds active all four quadrants, no masses, no organomegaly. Discomfort to palpation on the left lower abdominal quadrant (patient's obesity makes it difficult to do a full abdominal exam).   Lymph nodes: Cervical nodes normal  Neurologic:  Alert and oriented times 3. Normal reflexes. Cranial nerves II-XII intact.   Psychiatric: Normal mood and affect.      ADDITIONAL HISTORY SUMMARIZED (2): None.  DECISION TO OBTAIN EXTRA INFORMATION (1): None.   RADIOLOGY TESTS (1): Ordered Abdominal X-ray.   LABS (1): Ordered lab. Reviewed 1/7/2015 A1c.   MEDICINE TESTS (1): None.  INDEPENDENT REVIEW (2 each): None.       The visit lasted a total of 25 minutes face to face with the patient. Over 50% of the time was spent counseling and educating the patient about cough/eye drainage and abdominal pain.    Gerald LAU, am scribing for and in the presence of, Dr. Guzman.    IDr. Guzman, personally performed the services described in this documentation, as scribed by Gerald Sahni in my presence, and it is both accurate and complete.    MEDICATIONS:  Current Outpatient Prescriptions   Medication Sig Dispense Refill     fexofenadine (ALLEGRA) 180 MG tablet Take 180 mg by mouth daily. For allergies       pseudoephedrine (SUDAFED) 60 MG tablet Take 60 mg by mouth every 4 (four) hours as needed for congestion.       ascorbic acid (ASCORBIC ACID WITH CHRISTOPH HIPS) 500 MG tablet Take 500 mg by mouth daily.       blood glucose meter (GLUCOMETER) Use 1 each As Directed as needed. Dispense glucometer brand per patient's insurance at pharmacy discretion. 1 each 0     blood glucose test (ONETOUCH ULTRA TEST) strips Use 1 each As Directed as needed. Up to 3 - 4 times daily 100 each 0     cholecalciferol, vitamin D3, 1,000 unit tablet Take 1,000 Units by mouth daily.       generic  lancets (ONETOUCH ULTRASOFT) Use 1 each As Directed as needed. By Physician or test up to 4 times daily 100 each 1     No current facility-administered medications for this visit.        Total data points: 2

## 2021-06-10 NOTE — PROGRESS NOTES
Assessment: Mohsen comes in today for education on diabetes management.  He states that before the diagnosis he hasn't been feeling like he has much energy and has polyuria.  He is on his sleep apnea machine which he states has helped him feel a lot more rested.  Today I went over diabetes pathophysiology, metformin/glp-1/XIAO/DPP-4 physiology and side effects, A1C & bg goals, preventing future complications, hypo/hyperglycemia, nutritional label reading, portion sizing, carb counting, and when to test in a day.  He admits he is very fearful of needles and would not like to do any of that for his management of diabetes.  After further discussion about trulicity he is interested in trying it as it is once weekly.  He mentions he is honest that if he is freaked out by the shot, he probably won't take it again.  Patient denies hx of pancreatitis and family hx of medullary thyroid cancer.  This is a contraindication for any GLP-1.  I encouraged him to eat less per meal to reduce side effects of nausea.  I encouraged him to test his blood sugar 2x/daily before and after meals to see what it does for his blood sugars.  I also told him to become aware of his hunger for food and if that lessens with the trulicity.  I informed him that if he does not like taking the trulicity, to take januvia 100mg once daily otherwise.  He mentions as of recent he was put on higher doses of metformin 2000mg & glipizide 20mg right away.  He states he was feeling really bad and constantly needing to go to the bathroom.  Due to this I suggested he switch to metformin xr 2000mg & to stop taking the glipizide if he starts trulicity or januvia.  Glipizide is a medication that tends to make the pancreas work harder than it already is, forcing it into early intermediate due to burn out.  I want to keep his pancreas in the game so I suggested benching the glipizide.  He mentions he has been eating much healthier and less portions throughout the day.  I  suggested to eat healthier proteins/fats/veggies to feel less hungry and reduce snacking.        Plan: Switch to metformin xr 2000mg & start trulicity 0.75mg.  If he does not take the trulicity due to his fear of needles, he was suggested to take januvia once daily.  He will stop taking his glipizide if he takes either of these two medications.  He will start to test his blood sugars 2x/daily to see what certain meals raise his blood sugar more.  He will reduce his snacking and eating of sweets.  He knows to bring his meter in with him next time so we can review his blood sugars.  Follow up with me in 1 month to review his medications and success with weight loss.  His goal is to lose 10+ pounds in 3 months.      Subjective and Objective:      Mohsen Morales is referred by Ada Guzman for Diabetes Education.     Lab Results   Component Value Date    HGBA1C 10.8 (H) 05/02/2017         Current diabetes medications:  Metformin 2000mg, glipizide 10mg BID    Goals     None          Follow up:   Primary care visit  CDE (certified diabetic educator)      Education:     Monitoring   Meter (per above goals): Assessed, Discussed and Literature provided  Monitoring: Assessed, Discussed and Literature provided  BG goals: Assessed, Discussed and Literature provided    Nutrition Management  Nutrition Management: Assessed, Discussed and Literature provided  Weight: Assessed, Discussed and Literature provided  Portions/Balance: Assessed, Discussed and Literature provided  Carb ID/Count: Assessed, Discussed and Literature provided  Label Reading: Assessed, Discussed and Literature provided  Heart Healthy Fats: Assessed, Discussed and Literature provided  Menu Planning: Assessed and Discussed  Dining Out: Assessed and Discussed  Physical Activity: Assessed and Discussed  Medications: Assessed and Discussed  Orals: Assessed and Discussed  Injected Medications: Assessed and Discussed   Storage/Exp:Assessed and Discussed   Site  Rotation: Assessed and Discussed   Sites Assessed: no    Diabetes Disease Process: Assessed, Discussed and Literature provided    Acute Complications: Prevent, Detect, Treat:  Hypoglycemia: Assessed and Discussed  Hyperglycemia: Assessed and Discussed  Sick Days: Not addressed  Driving: Not addressed    Chronic Complications  Foot Care:Assessed and Discussed  Skin Care: Not addressed  Eye: Assessed and Discussed  ABC: Assessed and Discussed  Teeth:Assessed and Discussed  Goal Setting and Problem Solving: Assessed and Discussed  Barriers: Assessed and Discussed  Psychosocial Adjustments: Assessed and Discussed      Time spent with the patient: 90 minutes for diabetes education and counseling.   Previous Education: no  Visit Type:DILAN Davila  5/23/2017

## 2021-06-10 NOTE — PROGRESS NOTES
Medication Therapy Management Initial Visit     ASSESSMENT AND PLAN  1. Diabetes mellitus type 2 in obese  Not at goal A1c less than 7% per ADA guidelines, largely due to inactivity and poor diet.  He has been testing his blood sugars, however has been checking random blood sugars rather than fasting and postprandial and more meaningful fashion.  Provided additional education regarding the benefits of checking true fasting blood sugars and 2 hour postprandial blood sugars to ensure safety and efficacy of his current medication regimen.  He demonstrates understanding and will have these blood sugars for use by diabetes educator in follow-up.  Due to lack of postprandial blood sugars recommend continue current dose of glipizide however there is room to titrate this dose.  Currently on max goal dose of metformin daily.  She would likely benefit from GLP-1 agonist in order to improve blood sugars and likely replace glipizide, however he is motivated to improve his blood sugars and decrease his use of medications through lifestyle changes.  Recommend to discuss further with diabetes educator, and follow-up with PCP in 30 days.  He would like a prescription refilled for the correct lancets to use with his meter, is also testing 4 times daily therefore sent a refill for allergic quantity of test strips.  -Check blood sugars fasting and postprandial twice daily  -Refill lancets lancets (ONETOUCH DELICA LANCETS) 30 gauge Misc; Inject 1 each under the skin 4 (four) times a day.  Dispense: 300 each; Refill: 3  - blood glucose test (ONETOUCH ULTRA TEST) strips; Use 1 each As Directed 4 (four) times a day.  Dispense: 300 each; Refill: 3    2. Allergies  Stable at this time, recommend continue current regimen.    3. Sprain of ankle, unspecified laterality, unspecified ligament, sequela  Stable, safe for continued use of Aleve.  Recommend follow-up with PCP if pain does not resolve.      The following high  "BMI interventions were performed this visit: encouragement to exercise and lifestyle education regarding diet    FOLLOW-UP PLAN  Mohsen was advised to follow up with diabetes educator as scheduled.    The following instructions were given:   Patient Instructions   Diabetes Goals   Hgb A1c = 7% or less   Fasting Blood Sugars (before meals) = less then 140   Post Prandial Blood Sugars (2 hours after meals) = 180 or less    Things you can do to help lower your blood sugars:  Diet: 3 meals and 1-2 snacks per day with 45-60 grams of carbohydrates per meal and 15-30 grams of carbohydrates per snack. Try to fill your plate at least half-full with vegetables, fill one-quarter full with lean meats or protein, and also make sure you get at least some carbohydrate with every meal.    Exercise: 30 minutes per day of anything that will increase your heart rate and make you break a sweat! Gardening, walking, cleaning the house, etc even count! If you feel like 30 minutes per day is too much, start small. Even lifting canned foods or working your arms with a resistance band in front of the TV can help.      SUBJECTIVE AND OBJECTIVE  Mohsen Morales is a 37 y.o. male who was referred by Ada Guzman MD for MTM services.  Mohsen's chief concern today is diabetes medication management. Has a two year old at home. His left sided pain has resolved since seeing Dr. Guzman.     Diabetes: \"The first few days were rough\", having diarrhea and cramps. Had to take three days off of work to get through this after starting max dose metformin and glipizide. Almost called to stop these. Has been checking blood sugars several times daily. Blood sugars are coming down, 150-400. Fasting sugars have been 150-200's. None of his sugars are 2 hours post prandial. Meeting with diabetes educator next week. He doesn't have symptoms when he is \"too high\" but he has been checking his sugars when he is not feeling well. A few years ago he had been " checking his sugars when he was diagnosed with pre-diabetes. Fasting sugars at that time were 70-80's. He does have a sweet tooth. He realizes that with his son he is not eating as healthy as they would like to. 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 They are eliminating carbohydrates around the house. Has noticed that he does have a reduced appetite, and has resulted in decreased portion size. He has been trying to drink more water. He and his wife are looking to go on more walks as they both would like to loose weight.     Allergies: daily allegra and as needed pseudophed for nasal congestion. He does have a CPAP for at night but when he is really congested he may need to use pseudophed. He has tried nasal products with poor efficacy.     Ankle pain: uses aleve nightly for pain in his right leg. Aleve dulls the pain, but with more walking or standing this may get worse. If this continues to get worse he will come in.     Adherence: Mohsen misses 0 doses of medications per week.     This note has been dictated using voice recognition software. Any grammatical or context distortions are unintentional and inherent to the software.    Mohsen was provided with follow up instructions, and this care plan was communicated via EMR with his primary care provider, Ada Guzman MD, and is the authorizing prescriber for this visit.  Direct supervision was available by either the patient's PCP or another available physician when needed.    Time spent: 40 minutes  Level of service: 3    Zbigniew Escoto, PharmD, BCACP  Medication Management (MTM) Pharmacist  [facility]    We reviewed Mohsen's medication list with them, discussing reason for use, directions for use, and potential side effects of each medication.  Indication, safety, efficacy, and convenience was assessed for all reviewed medications.  No environmental factors were noted currently affecting patient.    Current Outpatient Prescriptions   Medication Sig  Dispense Refill     naproxen sodium (ALEVE) 220 MG tablet Take 440 mg by mouth at bedtime.       blood glucose meter (GLUCOMETER) Use 1 each As Directed as needed. Dispense glucometer brand per patient's insurance at pharmacy discretion. 1 each 0     blood glucose test (ONETOUCH ULTRA TEST) strips Use 1 each As Directed 4 (four) times a day. 300 each 3     fexofenadine (ALLEGRA) 180 MG tablet Take 180 mg by mouth daily. For allergies       glipiZIDE (GLUCOTROL) 10 MG tablet Take 1 tablet (10 mg total) by mouth 2 (two) times a day with meals. 60 tablet 3     lancets (ONETOUCH DELICA LANCETS) 30 gauge Misc Inject 1 each under the skin 4 (four) times a day. 300 each 3     metFORMIN (GLUCOPHAGE) 1000 MG tablet Take 1 tablet (1,000 mg total) by mouth 2 (two) times a day with meals. 60 tablet 4     pseudoephedrine (SUDAFED) 60 MG tablet Take 60 mg by mouth every 4 (four) hours as needed for congestion.       No current facility-administered medications for this visit.

## 2021-06-11 NOTE — PROGRESS NOTES
Assessment: Mohsen is a pleasant cosme in for a follow up on his diabetes management.  He recently started trulicity 0.75mg and switched to metformin xr 2000mg.  He states he is having less loose stools since the XR switch, but is still having them occasionally.  He mentions that he was able to reframe his fear of needles and get over it to inject trulicity weekly.  He states he does feel nauseous especially during days 3 & 4 after taking it.  He has adjusted to this feeling and has eaten less.  He has taken 4 doses & states when he eats noosa yogurt, his feeling of nausea gets much better.  We discussed how his father got into probiotics later in his life and how that helped his father feel better.  I suggested getting more probiotics to help with digestion & upset stomach through the intake of sauerkraut, kombucha, kimchi, kefir, cultured cheeses, & greek yogurt.  Mohsen did not bring his meter with him today, but states his morning blood sugars have fallen from high 200s to 149-180s.  He checks before lunch & dinner which is usually from 180-200.  Before bed his blood sugars range from 200-240.  I told him I would like to continue bringing these blood sugars down further so he feels better, but with his nausea he is not ready to increase his trulicity dose.  He is not willing to do daily injections & states he would like to exercise more when his ankle feels better while continue eating healthier meals with low carbs.  He states he does feel better with lower blood sugars, but this may also be due to less stress at the work place.  He is changing jobs in a couple weeks with better hours, to challenge himself more, & something that will correlate with his master's degree.  Before he was working until 7pm and was eating a later dinner at 8-10pm.  Now he will be eating much earlier getting done with work by 4-5pm.  He jokes that he and his wife have been eating better because his son (who is 2 years old), eats tons of  vegetables/fruits and doesn't like to eat potatoes or bread as much.      Mohsen has started to eat healthier meals.  For breakfast he normally has eggs with sausage and 2 eggos or 2 pieces of toast.  For lunch he has salad or a smoothie.  If he skips lunch he will have a handful of almonds/walnuts/pecans.  For dinner he will have pork with rice & broccoli/asparagus or he will have a chicken salad.  He has been incorporating more smoothies with yogurt and fruit into his diet.  I suggested adding flax, bibi, or hemp hearts to add more fiber into his diet.    Plan: Continue metformin xr 2000mg & trulicity 0.75mg.  He will start to introduce more foods/drinks rich in probiotics to see if this helps to ease his upset stomach or nausea.  He will continue to elevate and rest his ankle until it feels better.  I urged him to walk & exercise more to help with weight loss.  He lost 2 pounds since our last appointment & is hoping to lose more weight to reduce his need for medications.  He will call with any questions in the meantime, but has a follow up in 2 months to review his new a1c and blood sugars.    Subjective and Objective:      Mohsen Morales is referred by Dr. Guzman for Diabetes Education.     Lab Results   Component Value Date    HGBA1C 10.8 (H) 05/02/2017         Current diabetes medications:  Metformin xr 2000mg, trulicity 0.75mg    Goals     None          Follow up:   Primary care visit  CDE (certified diabetic educator)      Education:     Monitoring   Meter (per above goals): Assessed, Discussed and Literature provided  Monitoring: Assessed, Discussed and Literature provided  BG goals: Assessed, Discussed and Literature provided    Nutrition Management  Nutrition Management: Assessed and Discussed  Weight: Assessed, Discussed and Literature provided  Portions/Balance: Assessed and Discussed  Carb ID/Count: Not addressed  Label Reading: Not addressed  Heart Healthy Fats: Assessed and Discussed  Menu Planning:  Assessed  Dining Out: Assessed and Discussed  Physical Activity: Assessed and Discussed  Medications: Assessed and Discussed  Orals: Assessed and Discussed  Injected Medications: Assessed and Discussed   Storage/Exp:Assessed and Discussed   Site Rotation: Assessed and Discussed   Sites Assessed: yes    Diabetes Disease Process: Assessed, Discussed and Literature provided    Acute Complications: Prevent, Detect, Treat:  Hypoglycemia: Assessed and Discussed  Hyperglycemia: Assessed and Discussed  Sick Days: Not addressed  Driving: Not addressed    Chronic Complications  Foot Care:Assessed and Discussed  Skin Care: Not addressed  Eye: Assessed and Discussed  ABC: Assessed and Discussed  Teeth:Not addressed  Goal Setting and Problem Solving: Assessed and Discussed  Barriers: Assessed and Discussed  Psychosocial Adjustments: Assessed and Discussed      Time spent with the patient: 60 minutes for diabetes education and counseling.   Previous Education: yes  Visit Type:DILAN Davila  6/26/2017

## 2021-06-11 NOTE — PROGRESS NOTES
ASSESSMENT:  1. Ankle injury, right, initial encounter  - XR Ankle Right 3 or More VWS; Future    PLAN:  There are no Patient Instructions on file for this visit.    Orders Placed This Encounter   Procedures     XR Ankle Right 3 or More VWS     Standing Status:   Future     Number of Occurrences:   1     Standing Expiration Date:   5/31/2018     Order Specific Question:   Reason for Exam (Describe Symptoms):     Answer:   Ankle Injury     Order Specific Question:   Can the procedure be changed per Radiologist protocol?     Answer:   Yes     There are no discontinued medications.    No Follow-up on file.     Ordered and read Right Ankle X-ray, which did not show any fractures. Patient will  and use a right ankle brace. Patient will let us know what he is able to do at work, and will write a letter for his work accordingly.     CHIEF COMPLAINT:  Chief Complaint   Patient presents with     Ankle Pain     INCREASED PAIN ON R ANKLE       HISTORY OF PRESENT ILLNESS:  Mohsen is a 37 y.o. male presenting to the clinic today for evaluation of ankle pain. He twisted his right ankle on May 29th at work while turning while ambulating. His ankle feels painful and swollen. The pain is mostly on the outside of his foot. The pain is worse in certain foot positions. He occasionally has shooting pain up into his lower a leg a little. He has been limping since the injury. He denies any bruising. He has used Aleve without much pain improvement. He has been keeping his foot elevated and trying to rest. Of note, he had a similar injury about 3 years ago, and the pain now feels worse than the pain he had 3 years ago. He used an ankle brace 3 years ago with improvement, and he is planning on picking up an ankle brace for the current injury. For the previous ankle injury, he notes that it took him about 2 weeks to recover. He notes that his job requires him to be on his feet a lot.     Diabetes: He has seen Medication Management and  Diabetes Education. He uses medications including metformin and Trulicity. He no longer takes glipizide.     REVIEW OF SYSTEMS:   Comprehensive review of systems negative except as noted above.    PFSH:  Reviewed, as below.     TOBACCO USE:  History   Smoking Status     Never Smoker   Smokeless Tobacco     Never Used       VITALS:  Vitals:    05/31/17 1101   BP: 134/74   Pulse: 72   Weight: (!) 366 lb 11.2 oz (166.3 kg)     Wt Readings from Last 3 Encounters:   05/31/17 (!) 366 lb 11.2 oz (166.3 kg)   05/23/17 (!) 366 lb (166 kg)   05/02/17 (!) 359 lb 12.8 oz (163.2 kg)     Body mass index is 48.38 kg/(m^2).    PHYSICAL EXAM:  General Appearance: Alert, cooperative, no distress, appears stated age  HEENT: Pupils equal, symmetric  Lungs: Respirations unlabored  Musculoskeletal: Right Ankle: Swelling on the right ankle with tenderness around the lateral malleolus and under surface, ankle is stable but movement causes pain, slight distal leg tenderness.   Neurologic:  Alert and oriented times 3. Normal reflexes. Cranial nerves II-XII intact.   Psychiatric: Normal mood and affect.    X-ray, Right Ankle: Negative except for signs of arthritis on the distal fibula head. No fractures.       ADDITIONAL HISTORY SUMMARIZED (2): Reviewed 5/16/2017 note from [facility] regarding diabetes.   DECISION TO OBTAIN EXTRA INFORMATION (1): None.   RADIOLOGY TESTS (1): Ordered Right Ankle X-ray.   LABS (1): None.  MEDICINE TESTS (1): None.  INDEPENDENT REVIEW (2 each): Reviewed Right Ankle X-ray, as above.     The visit lasted a total of 16 minutes face to face with the patient. Over 50% of the time was spent counseling and educating the patient about ankle pain.    IGerald, am scribing for and in the presence of, Dr. Guzman.    I, Dr. Guzman, personally performed the services described in this documentation, as scribed by Gerald Sahni in my presence, and it is both accurate and complete.    MEDICATIONS:  Current Outpatient  Prescriptions   Medication Sig Dispense Refill     blood glucose meter (GLUCOMETER) Use 1 each As Directed as needed. Dispense glucometer brand per patient's insurance at pharmacy discretion. 1 each 0     blood glucose test (ONETOUCH ULTRA TEST) strips Use 1 each As Directed 4 (four) times a day. 300 each 3     dulaglutide (TRULICITY) 0.75 mg/0.5 mL PnIj Inject 0.75 mg under the skin every 7 days. 2 mL 1     fexofenadine (ALLEGRA) 180 MG tablet Take 180 mg by mouth daily. For allergies       lancets (ONETOUCH DELICA LANCETS) 30 gauge Misc Inject 1 each under the skin 4 (four) times a day. 300 each 3     metFORMIN (GLUCOPHAGE-XR) 500 MG 24 hr tablet Take 2 tablets (1,000 mg total) by mouth 2 (two) times a day. 120 tablet 6     naproxen sodium (ALEVE) 220 MG tablet Take 440 mg by mouth at bedtime.       pseudoephedrine (SUDAFED) 60 MG tablet Take 60 mg by mouth every 4 (four) hours as needed for congestion.       glipiZIDE (GLUCOTROL) 10 MG tablet Take 1 tablet (10 mg total) by mouth 2 (two) times a day with meals. 60 tablet 3     sitaGLIPtin (JANUVIA) 100 MG tablet Take 1 tablet (100 mg total) by mouth daily. 30 tablet 5     No current facility-administered medications for this visit.        Total data points: 5

## 2021-06-12 NOTE — PROGRESS NOTES
Assessment/Plan:        Diagnoses and all orders for this visit:    Acute conjunctivitis of right eye, unspecified acute conjunctivitis type  -     neomycin-polymyxin-hydrocortisone 1% (CORTISPORIN) 3.5-10,000-10 mg-unit-mg/mL ophthalmic suspension; Administer 2 drops to the right eye 3 (three) times a day.  Dispense: 7.5 mL; Refill: 0    Uncontrolled type 2 diabetes mellitus without complication, without long-term current use of insulin  -     dulaglutide (TRULICITY) 0.75 mg/0.5 mL PnIj; Inject 0.75 mg under the skin every 7 days.  Dispense: 12 Syringe; Refill: 1     I did start him on antibiotic eyedrops.  I did explain to him the major causes of conjunctivitis which at this point appears to be more viral.  He will follow-up if it is not getting any improved.  Also discussed diabetes mellitus and he is planning on following up with the diabetic educator.  Medications were refilled.    Subjective:    Patient ID: Mohsen Morales is a 37 y.o. male.    HPI Comments: Coumadin with redness of the right eye with no upper respiratory tract symptoms.  He has a history of diabetes mellitus and has been seen and evaluated for it.  He is been seen in the diabetic educator.  He currently had prior authorization for TRULICITY which will be starting soon.  Notes some good improvement with his diabetes.    Conjunctivitis    The current episode started 2 days ago. The onset was sudden. The problem occurs continuously. The problem has been gradually worsening. The problem is moderate. Nothing relieves the symptoms. Associated symptoms include decreased vision, eye itching, eye discharge, eye pain and eye redness. Pertinent negatives include no fever, no double vision, no abdominal pain, no vomiting, no congestion, no ear discharge, no ear pain, no headaches, no rhinorrhea, no sore throat, no muscle aches, no neck pain, no cough and no wheezing. The eye pain is mild. The eye pain is not associated with movement. The eyelid exhibits no  abnormality.       The following portions of the patient's history were reviewed and updated as appropriate: allergies, current medications, past family history, past medical history, past social history, past surgical history and problem list.    Review of Systems   Constitutional: Negative for fever.   HENT: Negative for congestion, ear discharge, ear pain, rhinorrhea and sore throat.    Eyes: Positive for pain, discharge, redness and itching. Negative for double vision.   Respiratory: Negative for cough and wheezing.    Gastrointestinal: Negative for abdominal pain and vomiting.   Musculoskeletal: Negative for neck pain.   Neurological: Negative for headaches.           Vitals:    08/21/17 1130   BP: 138/84   Pulse: 76   Temp: 97.9  F (36.6  C)   Weight: (!) 367 lb (166.5 kg)         Objective:    Physical Exam   Constitutional: He is oriented to person, place, and time. He appears well-developed and well-nourished. No distress.   HENT:   Head: Normocephalic.   Mouth/Throat: Oropharynx is clear and moist.   Eyes: Right eye exhibits chemosis and discharge. No foreign body present in the right eye. Left eye exhibits no chemosis and no discharge. Right conjunctiva is injected. Left conjunctiva is not injected. No scleral icterus.   Neck: Normal range of motion. Neck supple.   Cardiovascular: Normal rate and regular rhythm.    Pulmonary/Chest: Effort normal and breath sounds normal.   Abdominal: Soft. Bowel sounds are normal.   Neurological: He is alert and oriented to person, place, and time.

## 2021-06-16 NOTE — PROGRESS NOTES
Assessment:   1. Uncontrolled type 2 diabetes mellitus without complication, without long-term current use of insulin  - metFORMIN (GLUCOPHAGE-XR) 500 MG 24 hr tablet; Take 2 tablets (1,000 mg total) by mouth 2 (two) times a day.  Dispense: 120 tablet; Refill: 0  - dulaglutide (TRULICITY) 0.75 mg/0.5 mL PnIj; Inject 0.75 mg under the skin every 7 days.  Dispense: 12 Syringe; Refill: 1  - Glycosylated Hemoglobin A1c  - Lipid Cascade  - Comprehensive Metabolic Panel  - Microalbumin, Random Urine  - Ambulatory referral to Medication Management     Diabetes mellitus Type II, under inadequate control.      Plan:   A1c today was higher than previously ,patient noted not being surprised at the result.Will want to go back on Trulicity which he was on before now.Stopped due to insurance coverage.Has a change in insurance and will hope have a better coverage.He will continue with the Metformin,Januvia and now will start the Trulicity again.Will follow up in 3 months.  Addressed ADA diet.  Encouraged aerobic exercise.  Discussed foot care.  Reminded to get yearly retinal exam.     Subjective:       Mohsen Morales is a 38 y.o. male who presents for follow up of diabetes.Has been currently on Metformin and Januvia.Has been having blood sugars still high in the 250 -290 range.Having some sideeffects with Januvia but better on Metformin.Recently changed jobs,and will have better coverage for medications.Thinks he will like back on Trulicity as that appeared to be better for him. Current symptoms include: none. Patient denies nausea, polydipsia, polyuria and vomiting. Evaluation to date has been: fasting blood sugar, fasting lipid panel, hemoglobin A1C and microalbuminuria. Home sugars: BGs are running  consistent with Hgb A1C. Current treatments: none. Last dilated eye exam has not done one recently ,wants to wait till he starts the new job.    No past medical history on file.  No past surgical history on file.  Social History      Social History     Marital status: Single     Spouse name: N/A     Number of children: N/A     Years of education: N/A     Social History Main Topics     Smoking status: Never Smoker     Smokeless tobacco: Never Used     Alcohol use Not on file     Drug use: Not on file     Sexual activity: Not on file     Other Topics Concern     Not on file     Social History Narrative     Family History   Problem Relation Age of Onset     Hypertension Mother      Diabetes Paternal Uncle      Allergies   Allergen Reactions     Amoxicillin Other (See Comments)     Nauseated, upset stomach     Metformin Nausea Only     Penicillins      Current Outpatient Prescriptions   Medication Sig Dispense Refill     blood glucose meter (GLUCOMETER) Use 1 each As Directed as needed. Dispense glucometer brand per patient's insurance at pharmacy discretion. 1 each 0     blood glucose test (ONETOUCH ULTRA TEST) strips Use 1 each As Directed 6 (six) times a day. 600 each 3     fexofenadine (ALLEGRA) 180 MG tablet Take 180 mg by mouth daily. For allergies       JANUVIA 100 mg tablet TAKE 1 TABLET (100 MG TOTAL) BY MOUTH DAILY. 30 tablet 5     lancets (ONETOUCH DELICA LANCETS) 30 gauge Misc Inject 1 each under the skin 4 (four) times a day. 300 each 3     metFORMIN (GLUCOPHAGE-XR) 500 MG 24 hr tablet Take 2 tablets (1,000 mg total) by mouth 2 (two) times a day. 120 tablet 0     naproxen sodium (ALEVE) 220 MG tablet Take 440 mg by mouth at bedtime.       dulaglutide (TRULICITY) 0.75 mg/0.5 mL PnIj Inject 0.75 mg under the skin every 7 days. 12 Syringe 1     fexofenadine-pseudoephedrine (ALLEGRA-D)  mg per tablet Take 1 tablet by mouth.       glipiZIDE (GLUCOTROL) 10 MG tablet Take 1 tablet (10 mg total) by mouth 2 (two) times a day with meals. 60 tablet 3     neomycin-polymyxin-hydrocortisone 1% (CORTISPORIN) 3.5-10,000-10 mg-unit-mg/mL ophthalmic suspension Administer 2 drops to the right eye 3 (three) times a day. 7.5 mL 0      pseudoephedrine (SUDAFED) 60 MG tablet Take 60 mg by mouth every 4 (four) hours as needed for congestion.       tablet cutter Misc heated humidifier, mask, headgear, chinstrap, filters and tubing.       No current facility-administered medications for this visit.          Review of Systems  Constitutional: negative  Ears, nose, mouth, throat, and face: negative  Respiratory: negative  Cardiovascular: negative  Gastrointestinal: negative  Integument/breast: negative  Musculoskeletal:negative  Neurological: negative  Behavioral/Psych: negative      Objective:      /80 (Patient Site: Left Arm, Patient Position: Sitting, Cuff Size: Adult Large)  Pulse 76  Wt (!) 356 lb 3.2 oz (161.6 kg)  BMI 46.99 kg/m2  General appearance: alert, appears stated age, cooperative and moderately obese  Ears: normal TM's and external ear canals both ears  Throat: lips, mucosa, and tongue normal; teeth and gums normal  Neck: no adenopathy, supple, symmetrical, trachea midline and thyroid not enlarged, symmetric, no tenderness/mass/nodules  Lungs: clear to auscultation bilaterally  Heart: regular rate and rhythm, S1, S2 normal, no murmur, click, rub or gallop  Abdomen: soft, non-tender; bowel sounds normal; no masses,  no organomegaly  Extremities: extremities normal, atraumatic, no cyanosis or edema  Skin: Some lower leg discoloration due to venous stasis.  Neurologic: Grossly normal      Diabetic foot exam:   Left: Pulses Dorsalis Pedis:  present  Posterior Tibial:  present   Vibratory sensation normal   Filament test present    Right: Pulses Dorsalis Pedis:  present  Posterior Tibial:  present   Vibratory sensation normal   Filament test present    Patient was evaluated for proper footwear and sizing.

## 2021-06-16 NOTE — PROGRESS NOTES
MTM Encounter    ASSESSMENT AND PLAN    1. Diabetes mellitus type 2  Uncontrolled with last A1c of 11.1%. Mohsen started on Trulicity approximately one month ago that he is tolerating with some gastrointestinal symptoms. He has also made healthier diet changes and has been exercising more. Discussed continuing the current lower dose of Trulicity and to continue to monitor side effects and tolerability. If his A1c continues to remain elevated will consider increasing his Trulicity dose or potentially adding basal insulin or pioglitazone. Encouraged Mohsen to continue to make healthier diet modifications and to continue exercising. All of the patients questions were answered.  A moderate-intensity statin is clinically indicated due to this age, diabetes diagnosis, and ASCVD risk score of 1.8% per ACC/AHA cholesterol guidelines. Discussed the risks and benefits of statin therapy with Mohsen. However he declined to start a statin medication at this time.     2. Allergies: Controlled, continue current therapy.      Return in about 2 months (around 5/9/2018).      SUBJECTIVE AND OBJECTIVE  Mohsen Morales is a 38 y.o. male here for a medication therapy management (MTM) appointment.       Diabetes: Mohsen has been back on Trulicity 0.75 mg SQ weekly for the past 3-4 weeks. Around days 3 to 4 after taking Trulicity, he reports feeling a lot of gastrointestinal upset around day with frequent loose stools. This occurs more often when he eats more sugary foods. He states that he had a lot of gas with metformin initially but resolved over time. He states that he checks his fasting morning blood sugar about once every two days; his lowest fasting reading lately was 130 mg/dL. Mohsen states that his blood sugars have been decreasing since restarting Trulicity. He is very needle-phobic with the thought of injecting medication, but has been able to inject Trulicity. Mohsen has been making more healthy food choices including frequent salads. He  "has also been walking more since starting work at [employer]. He walks 1 mile at work and climbs 4 to 5 flights of stairs. He was previously on glipizide a few years ago.  He declines wanting to use a statin. He tends to be sensitive to medications and doesn't want to \"pump medications through his system.\"     Lab Results   Component Value Date    HGBA1C 11.1 (H) 02/13/2018     The 10-year ASCVD risk score ([place]and[place] MUSHTAQ Jr, et al., 2013) is: 1.8%    Values used to calculate the score:      Age: 38 years      Sex: Male      Is Non- : No      Diabetic: Yes      Tobacco smoker: No      Systolic Blood Pressure: 134 mmHg      Is BP treated: No      HDL Cholesterol: 54 mg/dL      Total Cholesterol: 189 mg/dL       BP Readings from Last 3 Encounters:   03/09/18 134/68   02/13/18 138/80   08/21/17 138/84     Allergies: For year-round allergies, he takes Allegra 180 mg daily. He is allergic to dust and ragweed. He believes this is weight related as well.         Mohsen's medication list was reviewed with them, discussing reason for use, directions for use, and potential side effects of each medication as needed. Indication, safety, efficacy, and convenience was assessed for all medications addressed above.  No environmental factors were noted currently affecting patient.  This care plan was communicated via EMR with his primary care provider, Aad Guzman MD, who is the authorizing prescriber for this visit.  Direct supervision was available by either the patient's PCP or other available provider.    Time Spent: 45 minutes  Time and complexity billing metrics are included in the doc-flowsheet linked to this visit.     Gabriela Boucher, Student Pharmacist  Cosigner:  Shabnam Haywood, PharmD, BCACP    Current Outpatient Prescriptions   Medication Sig Dispense Refill     multivitamin therapeutic tablet Take 1 tablet by mouth daily.       blood glucose test (ONETOUCH ULTRA TEST) strips Use 1 each As Directed " 6 (six) times a day. 600 each 3     dulaglutide (TRULICITY) 0.75 mg/0.5 mL PnIj Inject 0.75 mg under the skin every 7 days. 12 Syringe 1     fexofenadine (ALLEGRA) 180 MG tablet Take 180 mg by mouth daily. For allergies       lancets (ONETOUCH DELICA LANCETS) 30 gauge Misc Inject 1 each under the skin 4 (four) times a day. 300 each 3     metFORMIN (GLUCOPHAGE-XR) 500 MG 24 hr tablet Take 2 tablets (1,000 mg total) by mouth 2 (two) times a day. 120 tablet 0     naproxen sodium (ALEVE) 220 MG tablet Take 440 mg by mouth at bedtime.       No current facility-administered medications for this visit.

## 2021-06-18 NOTE — PROGRESS NOTES
MTM Initial Encounter     Assessment/Plan   1. Diabetes mellitus type 2   A1c did improve from 11.1% to 9.6%.  He is better tolerating the Trulicity so we could could consider a dose increase to help further lower A1c, but patient declines.  He would not like to make any changes with medication today and would like to focus on lifestyle modification.  Another consideration for the future if his A1c is still elevated, would be an alternative GLP-1 agonist such as Ozempic.   He would benefit from a moderate intensity statin given his age, diabetes diagnosis, and LDL of 107 mg/dl. Patient declines starting medication.  Plan   1. Check A1c.   2. Consider increasing Trulicity, but patient declined.   3. Consider starting statin, but patient declined.     2. Allergies  Controlled, continue current therapy.    Medication Adherence/Access: Good; no issues identified.     Follow Up  Return in about 3 months (around 8/18/2018).        Subjective/Objective   Mohsen Morales is a 38 y.o. male coming in for an initial visit for Medication Therapy Management. He was referred to me from Ada Guzman MD    Chief Complaint: Diabetes   Personal Health Care Goals: Focus on weight loss and blood sugar control     1. Diabetes mellitus type 2   Mohsen is taking metformin ER 2000 mg daily and Trulicity 0.75 mg SQ weekly. He is still having loose stool, but his nausea and general GI upset has improved the last few weeks.  He thinks the combination of metformin and Trulicity is working well to help lower his blood sugars and is happy that he is tolerating it better recently.  He has also changed jobs and is less stressed and think thinks that is helping as well.  He has been trying to eat healthier.  He is hoping to be more active now that the weather is nicer.      Lab Results   Component Value Date    HGBA1C 9.6 (H) 05/18/2018     The 10-year ASCVD risk score ([place] MUSHTAQ Jr, et al., 2013) is: 1.8%    Values used to calculate the  score:      Age: 38 years      Sex: Male      Is Non- : No      Diabetic: Yes      Tobacco smoker: No      Systolic Blood Pressure: 132 mmHg      Is BP treated: No      HDL Cholesterol: 54 mg/dL      Total Cholesterol: 189 mg/dL      2. Allergies: For year-round allergies, he takes Allegra 180 mg daily. He is allergic to dust and ragweed.    Medication Adherence/Access: Good; no issues identified.     PMH: reviewed in EPIC   Allergies/ADRs: reviewed in EPIC   Alcohol: reviewed in EPIC   Tobacco:   History   Smoking Status     Never Smoker   Smokeless Tobacco     Never Used     Today's Vitals:  BP Readings from Last 3 Encounters:   05/18/18 132/70   03/09/18 134/68   02/13/18 138/80     Pulse Readings from Last 3 Encounters:   05/18/18 96   03/09/18 100   02/13/18 76     Wt Readings from Last 3 Encounters:   05/18/18 (!) 358 lb 9.6 oz (162.7 kg)   03/09/18 (!) 361 lb 14.4 oz (164.2 kg)   02/13/18 (!) 356 lb 3.2 oz (161.6 kg)       ----------------  Much or all of the text in this note was generated through the use of Dragon Dictate voice-to-text software. Errors in spelling or words which seem out of context are unintentional. Sound alike errors, in particular, may have escaped editing.    The patient was given a summary of these recommendations via 2U    I spent 30 minutes with this patient today; Patient is not Medicare Part D. All changes were made via collaborative practice agreement with Ada Guzman MD. A copy of the visit note was provided to the patient's provider.     Shabnam Haywood, PharmD, BCACP  Medication Management Pharmacist  [facility]        Current Outpatient Prescriptions   Medication Sig Dispense Refill     blood glucose test (ONETOUCH ULTRA TEST) strips Use 1 each As Directed 6 (six) times a day. 600 each 3     dulaglutide (TRULICITY) 0.75 mg/0.5 mL PnIj Inject 0.75 mg under the skin every 7 days. 12 Syringe 1      fexofenadine (ALLEGRA) 180 MG tablet Take 180 mg by mouth daily. For allergies       lancets (ONETOUCH DELICA LANCETS) 30 gauge Misc Inject 1 each under the skin 4 (four) times a day. 300 each 3     metFORMIN (GLUCOPHAGE-XR) 500 MG 24 hr tablet TAKE 2 TABLETS (1,000 MG TOTAL) BY MOUTH 2 (TWO) TIMES A DAY. 120 tablet 0     multivitamin therapeutic tablet Take 1 tablet by mouth daily.       naproxen sodium (ALEVE) 220 MG tablet Take 440 mg by mouth at bedtime.       No current facility-administered medications for this visit.

## 2021-06-18 NOTE — PROGRESS NOTES
Assessment:     1. DM2 (diabetes mellitus, type 2)  - Glycosylated Hemoglobin A1c  - [facility] RED  - Lipid Cascade  - Comprehensive Metabolic Panel    2. ARY on CPAP     Plan:   He appears to be doing well at this time with an A1c of 9.6% as against 11.1% in 3 months ago.  We have discussed increasing his medication but he declined stating some side effects that he is not ready to tolerate at this point.  He noted that he will be increasing his physical activity.  He was encouraged to do that and will follow up with him in the next 3 months though he will see the pharmacist earlier.   Discussed general issues about diabetes pathophysiology and management.  Counseling at today's visit: discussed the need for weight loss, focused on the need for regular aerobic exercise, focused on the need to adhere to the prescribed ADA diet and discussed the advantages of a diet low in carbohydrates.  Suggested low cholesterol diet.  Encouraged aerobic exercise.  Discussed foot care.  Reminded to get yearly retinal exam.     Subjective:       Mohsen Morales is a 38 y.o. male who presents for follow up of diabetes.. Current symptoms include: none. Patient denies foot ulcerations, polydipsia, polyuria, visual disturbances and weight loss. Evaluation to date has been: fasting blood sugar, fasting lipid panel, hemoglobin A1C and microalbuminuria. Home sugars: BGs are running  consistent with Hgb A1C. Current treatments: none. Last dilated eye exam he has an appointment to be seen by the ophthalmologist.   He also has a history of obesity and has been attempting to lose weight though at this time there is a lot of stress in the family having had a  baby.  He does have some sleep apnea and has been using his CPAP machine and that has been helpful for him.  He noted no fatigue or tiredness.   Today's visit was with [facility] pharmacist.      The following portions of the patient's history were reviewed and updated as appropriate: allergies,  current medications, past family history, past medical history, past social history, past surgical history and problem list.    Review of Systems  A 12 point comprehensive review of systems was negative except as noted.      Objective:      /70  Pulse 96  Wt (!) 358 lb 9.6 oz (162.7 kg)  BMI 47.31 kg/m2  General appearance: alert, appears stated age, cooperative and moderately obese  Nose: Nares normal. Septum midline. Mucosa normal. No drainage or sinus tenderness.  Neck: no adenopathy, supple, symmetrical, trachea midline and thyroid not enlarged, symmetric, no tenderness/mass/nodules  Lungs: clear to auscultation bilaterally  Heart: regular rate and rhythm, S1, S2 normal, no murmur, click, rub or gallop  Abdomen: soft, non-tender; bowel sounds normal; no masses,  no organomegaly  Extremities: extremities normal, atraumatic, no cyanosis or edema  Pulses: 2+ and symmetric  Skin: Skin color, texture, turgor normal. No rashes or lesions  Neurologic: Alert and oriented X 3, normal strength and tone. Normal symmetric reflexes. Normal coordination and gait          Patient was evaluated for proper footwear and sizing.         .

## 2021-06-20 NOTE — PROGRESS NOTES
MTM Follow Up Encounter  Assessment & Plan                                                     1. Diabetes mellitus type 2   A1c improved slightly from 9.6% to 9%, still above goal of <7%. Experiencing GI side effect to Trulicity, so do not want to increase dose. Could consider change to alternative GLP-1 agonist, but patient refuses to use any injectable with visible needle. He would like to continue Trulicity at this time. Discussed need for additional medication given elevated A1c. Options include pioglitazone, SGLT-2 inhibitor, or basal insulin. Opted to start Jardiance, as this may also provide weight loss benefit. Medication education and counseling was provided, including indication, expected effect, dosing instructions, and possible side effects. The patient's questions were answered.   Blood pressure is well controlled and meeting goal of <140/90 mm Hg per JNC-8 hypertension guidelines. Previous borderline microalbuminuria, and not on ACEi or ARB, so will recheck today and continue to monitor.   Per ACC/AHA guidelines, statin therapy is recommended for patients with diabetes aged 40-75 with LDL >70 mg/dl. His LDL is relatively well controlled without medication at 84 mg/dl. Will recheck today. For now, patient has declined starting medication.   Plan   1. Order labs  - Glycosylated Hemoglobin A1c  - Microalbumin, Random Urine  - Comprehensive Metabolic Panel  - Lipid Profile  2. Start Jardiance 10 mg every morning.     2. Vitamin D deficiency  Because of previous low level, will recheck vitamin D level to assess need for supplementation.   Plan   1. Check vitamin D level   - Vitamin D, Total (25-Hydroxy)      Follow Up  Return in about 3 months (around 11/28/2018).      Subjective & Objective                                                       Mohsen Morales is a 38 y.o. male coming in for a follow up visit for Medication Therapy Management. He was referred to me from [facility] Megan,  MD    Chief Complaint: Diabetes follow up  Medication Adherence/Access: Good; no issues identified.     1. Diabetes mellitus type 2   Mohsen is taking Trulicity 0.75 mg SQ weekly and metformin ER 2000 mg daily. He notices 4-5 days after using his Trulicity dose, he will have upset stomach, gas, and diarrhea. He remembers having a similar reaction to Januvia. It has gotten better since he first started it. He doesn't want to use any injectable medication with a visible needle due to severe needle phobia. He remembers being on glipizide in the past and not tolerating it. His wife just had their second son so he is very tired lately.     Lab Results   Component Value Date    HGBA1C 9.0 (H) 08/28/2018     The 10-year ASCVD risk score ([place] LANDIN Jr, et al., 2013) is: 1.6%    Values used to calculate the score:      Age: 38 years      Sex: Male      Is Non- : No      Diabetic: Yes      Tobacco smoker: No      Systolic Blood Pressure: 132 mmHg      Is BP treated: No      HDL Cholesterol: 55 mg/dL      Total Cholesterol: 183 mg/dL    2. Vitamin D deficiency  Mohsen is not currently taking a vitamin D supplement.     Vitamin D, Total (25-Hydroxy)   Date Value Ref Range Status   01/07/2013 14.3 (L) 30.0 - 80.0 ng/mL Final     Comment:     Deficiency              <10.0 ng/mL               Insufficiency       10.0-29.9 ng/mL               Sufficiency         30.0-80.0 ng/mL               Toxicity (possible)    >100.0 ng/mL         PMH: reviewed in EPIC   Allergies/ADRs: reviewed in EPIC   Alcohol: reviewed in EPIC   Tobacco:   History   Smoking Status     Never Smoker   Smokeless Tobacco     Never Used     Today's Vitals:   BP Readings from Last 3 Encounters:   08/28/18 120/70   05/18/18 132/70   03/09/18 134/68     Pulse Readings from Last 3 Encounters:   08/28/18 76   05/18/18 96   03/09/18 100     Wt Readings from Last 3 Encounters:   05/18/18 (!) 358 lb 9.6 oz (162.7 kg)   03/09/18 (!) 361 lb 14.4 oz  (164.2 kg)   02/13/18 (!) 356 lb 3.2 oz (161.6 kg)     ----------------    Much or all of the text in this note was generated through the use of Dragon Dictate voice-to-text software. Errors in spelling or words which seem out of context are unintentional. Sound alike errors, in particular, may have escaped editing.    The patient was given a summary of these recommendations via [employer]    I spent 30 minutes with this patient today;   All changes were made via collaborative practice agreement with Ada Guzman MD. A copy of the visit note was provided to the patient's provider.     Shabnam Haywood, PharmD, BCACP  Medication Management Pharmacist  [facility]        Current Outpatient Prescriptions   Medication Sig Dispense Refill     blood glucose test (ONETOUCH ULTRA TEST) strips Use 1 each As Directed 6 (six) times a day. 600 each 3     empagliflozin (JARDIANCE) 10 mg Tab Take 1 tablet (10 mg total) by mouth every morning. 30 tablet 3     fexofenadine (ALLEGRA) 180 MG tablet Take 180 mg by mouth daily. For allergies       lancets (ONETOUCH DELICA LANCETS) 30 gauge Misc Inject 1 each under the skin 4 (four) times a day. 300 each 3     metFORMIN (GLUCOPHAGE-XR) 500 MG 24 hr tablet TAKE 2 TABLETS (1,000 MG TOTAL) BY MOUTH 2 (TWO) TIMES A DAY. 120 tablet 5     multivitamin therapeutic tablet Take 1 tablet by mouth daily.       naproxen sodium (ALEVE) 220 MG tablet Take 440 mg by mouth at bedtime.       TRULICITY 0.75 mg/0.5 mL PnIj INJECT 0.75 MG (0.5ML) UNDER THE SKIN EVERY 7 DAYS 6 Syringe 1     No current facility-administered medications for this visit.

## 2021-06-20 NOTE — PROGRESS NOTES
Assessment:     1. Diabetes mellitus type 2 in obese (H)  - Glycosylated Hemoglobin A1c  - Microalbumin, Random Urine  - Comprehensive Metabolic Panel  - LDL Cholesterol, Direct    2. Vitamin D deficiency  - Vitamin D, Total (25-Hydroxy)       Plan:     Discussed general issues about diabetes pathophysiology and management.  Counseling at today's visit: discussed the need for weight loss, focused on the need for regular aerobic exercise and focused on the need to adhere to the prescribed ADA diet.  Educational material distributed.  Suggested low cholesterol diet.  Encouraged aerobic exercise.  Discussed foot care.  Discussed the need for additional medication since his control for diabetes was low.His A1c was 9.0% lowered from 9.6% but that is still well above the recommendation. Discussed the need for a different medication.He does not want to be any other because of the side effects he has. Discussed the various options with him.Decided to start Jardiance and we talked about the side effects.We will see him in 3 months.  Subjective:       Mohsen Mroales is a 38 y.o. male who presents for follow up of diabetes..He is currently on Trulicity and Metformin. Current symptoms include: none. Patient denies nausea, paresthesia of the feet, visual disturbances and vomiting.He does note some side effects from the Trulicity about 4 days after taking the medicine when he feels very tired and unable to work. Evaluation to date has been: fasting blood sugar, fasting lipid panel, hemoglobin A1C and microalbuminuria. Home sugars: BGs are running  consistent with Hgb A1C. Current treatments: has been on Trulicity and Metformin.Not wanting to add another medication.. Last dilated eye exam up to date..    The following portions of the patient's history were reviewed and updated as appropriate: allergies, current medications, past family history, past medical history, past social history, past surgical history and problem  list.    Review of Systems  A 12 point comprehensive review of systems was negative except as noted.     Objective:     Vitals:    08/28/18 1257   BP: 120/70   Pulse: 78       General appearance: alert, appears stated age, cooperative and morbidly obese  Ears: normal TM's and external ear canals both ears  Throat: lips, mucosa, and tongue normal; teeth and gums normal  Lungs: clear to auscultation bilaterally  Heart: regular rate and rhythm, S1, S2 normal, no murmur, click, rub or gallop  Abdomen: soft, non-tender; bowel sounds normal; no masses,  no organomegaly  Extremities: extremities normal, atraumatic, no cyanosis or edema  Lymph nodes: Cervical, supraclavicular, and axillary nodes normal.  Neurologic: Grossly normal        Patient was evaluated for proper footwear and sizing.    Laboratory:  No components found for: A1C      .

## 2021-06-22 NOTE — PROGRESS NOTES
MTM Follow Up Encounter  Assessment & Plan                                                     1. Diabetes mellitus type 2   A1c remains uncontrolled and has increased to 10.1%.  Recommended initiation of basal insulin, but patient adamantly refuses.  Discussed other treatment options, although limited at this point due to past intolerances, but could start pioglitazone to help with insulin sensitization.  Again patient refuses.  He is open to trying dose increase and Trulicity, and will monitor for any worsening of GI side effects.  PCP and I had a yonathan discussion with patient about long-term complications of uncontrolled diabetes and strongly encouraged him to make changes in his diet and physical activity if he would rather not take additional medication.  He declines referral to dietitian.  Blood pressure is well controlled and meeting goal of <140/90 mm Hg per JNC-8 hypertension guidelines.   Per ACC/AHA guidelines, statin therapy is recommended for patients with diabetes aged 40-75 with LDL >70 mg/dl. His LDL is relatively well controlled without medication at 91 mg/dl. For now, patient has declined starting medication.   Plan   1.  Increase Trulicity to 1.5 mg subcu weekly.      Follow Up  Return in about 3 months (around 3/7/2019).      Subjective & Objective                                                       Mohsen Morales is a 38 y.o. male coming in for a follow up visit for Medication Therapy Management. He was referred to me from Ada Guzman MD    Chief Complaint: Diabetes follow up  Medication Adherence/Access: Good; no issues identified.     1. Diabetes mellitus type 2   Mohsen is taking Trulicity 0.75 mg SQ weekly, and metformin ER 2000 mg daily.  We tried starting Jardiance at our last visit, the patient states after 1 dose he had a severe reaction and became very dehydrated and had a headache and dizzy.  He is tolerating the Trulicity better, but still notices 4-5 days after using  "his Trulicity dose, he will have upset stomach, gas, and diarrhea. He remembers having a similar reaction to Januvia. He doesn't want to use any injectable medication with a visible needle due to severe needle phobia. He remembers being on glipizide in the past and not tolerating it.   He will feels strongly about not being \"overmedicated.\"  He had to stop his Trulicity and metformin for a week and a half due to the stomach flu and he said that he feels better off medication.  He refuses statin or insulin.  He admits to having a sweet tooth and dietary indiscretion.  He did make some positive changes after first starting Trulicity, but admits that there is still room for improvement.  He does have some physical limitations and is not able to get much exercise.  He declines referral to dietitian and states that he is too busy and already knows the changes he should make in his diet.    Lab Results   Component Value Date    HGBA1C 10.1 (H) 12/07/2018     The 10-year ASCVD risk score ([place] LANDIN Jr., et al., 2013) is: 1.8%    Values used to calculate the score:      Age: 38 years      Sex: Male      Is Non- : No      Diabetic: Yes      Tobacco smoker: No      Systolic Blood Pressure: 138 mmHg      Is BP treated: No      HDL Cholesterol: 55 mg/dL      Total Cholesterol: 183 mg/dL        PMH: reviewed in EPIC   Allergies/ADRs: reviewed in EPIC   Alcohol: reviewed in EPIC   Tobacco:   Social History     Tobacco Use   Smoking Status Never Smoker   Smokeless Tobacco Never Used     Today's Vitals:   BP Readings from Last 3 Encounters:   12/07/18 138/78   08/28/18 120/70   08/28/18 120/70     Pulse Readings from Last 3 Encounters:   12/07/18 68   08/28/18 76   08/28/18 78     Wt Readings from Last 3 Encounters:   12/07/18 (!) 359 lb 3.2 oz (162.9 kg)   05/18/18 (!) 358 lb 9.6 oz (162.7 kg)   03/09/18 (!) 361 lb 14.4 oz (164.2 kg)     ----------------    Much or all of the text in this note was generated " through the use of Dragon Dictate voice-to-text software. Errors in spelling or words which seem out of context are unintentional. Sound alike errors, in particular, may have escaped editing.    The patient was given a summary of these recommendations via [employer]    I spent 30 minutes with this patient today;   All changes were made via collaborative practice agreement with Ada Guzman MD. A copy of the visit note was provided to the patient's provider.     Shabnam Haywood, PharmD, BCACP  Medication Management Pharmacist  [facility]        Current Outpatient Medications   Medication Sig Dispense Refill     blood glucose test (ONETOUCH ULTRA TEST) strips Use 1 each As Directed 6 (six) times a day. 600 each 3     dulaglutide (TRULICITY) 1.5 mg/0.5 mL PnIj Inject 1.5 mg under the skin every 7 days. 4 Syringe 2     fexofenadine (ALLEGRA) 180 MG tablet Take 180 mg by mouth daily. For allergies       lancets (ONETOUCH DELICA LANCETS) 30 gauge Misc Inject 1 each under the skin 4 (four) times a day. 300 each 3     metFORMIN (GLUCOPHAGE-XR) 500 MG 24 hr tablet TAKE 2 TABLETS (1,000 MG TOTAL) BY MOUTH 2 (TWO) TIMES A DAY. 120 tablet 5     miscellaneous medical supply [facility] CPAP machine for home use at pressure: 14cmw , Heated humidifier x 1, Humidifier chamber x 1, chin strapx1, nasal face mask with cushion x 1, Heated tubing x 1, Headgear x 1, Filters: Disposable x 1pk & Reusable x 1pk, Length of Need: 99 months, Frequency of use: Daily       multivitamin therapeutic tablet Take 1 tablet by mouth daily.       naproxen sodium (ALEVE) 220 MG tablet Take 440 mg by mouth at bedtime.       No current facility-administered medications for this visit.

## 2021-06-22 NOTE — PROGRESS NOTES
"Assessment:     1. Diabetes mellitus type 2 in obese (H)  - Glycosylated Hemoglobin A1C  - Comprehensive Metabolic Panel  - LDL Cholesterol, Direct  His A1c today is higher than 3 months ago, it is 10.1% today, and 3 months ago was 9.0%.  We did show him the trend to ensure that his diabetes is getting worse.  He was given a prescription for Jardiance and he noted being dehydrated from it.  At this point we did talk to him regarding control, unfortunately he appears to have a lot of difficulty accepting medication due to potential side effects as well as not wanting to be \"overmedicated\".  We did go through different medicines and wanted him to consider insulin but he was totally against that.  We decided to increase the Trulicity to double the dose which he agreed to.  He did note that if he notes any worse side effects that he will stop it.  We also discussed having him see the nutritionist as well as possibly referring him to endocrinologist.  But will want to recheck this again in 3 months to decide which way.  We did encourage him to seriously watch his diet and make a lot of changes as he can.  Unfortunately he cannot really exercise because of his right ankle which he noted had a high ankle sprain sometime in the past and still gives him a lot of concern and problem.  2. Vaccine for diphtheria-tetanus  - Td, Preservative Free (green label)    3. Need for influenza vaccination  - Influenza, Recombinant, Inj, Quadrivalent, PF, 18+YRS    4. Class 3 severe obesity with serious comorbidity and body mass index (BMI) of 45.0 to 49.9 in adult, unspecified obesity type (H)  Discussed weight loss at this point with lifestyle changes.  As stated discussed various ways that that can be achieved including nutritionist, and dietary as well as exercise as he can tolerate.    Plan:      Addressed ADA diet.  Suggested low cholesterol diet.  Encouraged aerobic exercise.  Reminded to get yearly retinal exam.  Follow up in 6 " months or as needed.     Subjective:       Mohsen Morales is a 38 y.o. male who presents for follow up of diabetes.the visit was done with [facility] pharmacist.  He was seen 3 months ago, at that point he had Jardiance added to his treatment regimen to see if that will help to lower his A1c which was at that time 9.0%.  He did note today that he developed some symptoms which he describes as dehydration from the medication and was able to take only 1 tablet.  He also noted being sick about a month ago and was not taking any medications at that time.  Unfortunately also he had not been really physically active.  He also does not consistently check his blood glucose at home. Current symptoms include: none. Patient denies nausea, visual disturbances, vomiting and weight loss.  Notes intermittent numbness to the left third and fourth toes.  He does note feeling a lot better when he is not taking his medications.  Evaluation to date has been: fasting blood sugar, fasting lipid panel, hemoglobin A1C and microalbuminuria. Home sugars: He does not consistently check his blood sugar.. Current treatments: As noted the last medication change was to add Jardiance while he continues with his Trulicity and metformin.. Last dilated eye exam notes that he will get that scheduled.    No past medical history on file.  No past surgical history on file.  Social History     Socioeconomic History     Marital status: Single     Spouse name: None     Number of children: None     Years of education: None     Highest education level: None   Social Needs     Financial resource strain: None     Food insecurity - worry: None     Food insecurity - inability: None     Transportation needs - medical: None     Transportation needs - non-medical: None   Occupational History     None   Tobacco Use     Smoking status: Never Smoker     Smokeless tobacco: Never Used   Substance and Sexual Activity     Alcohol use: None     Drug use: None     Sexual activity:  None   Other Topics Concern     None   Social History Narrative     None     Family History   Problem Relation Age of Onset     Hypertension Mother      Diabetes Paternal Uncle      Allergies   Allergen Reactions     Amoxicillin Other (See Comments)     Nauseated, upset stomach     Glipizide Nausea And Vomiting     Jardiance [Empagliflozin] Other (See Comments)     dehydated     Penicillins      Current Outpatient Medications   Medication Sig Dispense Refill     blood glucose test (ONETOUCH ULTRA TEST) strips Use 1 each As Directed 6 (six) times a day. 600 each 3     fexofenadine (ALLEGRA) 180 MG tablet Take 180 mg by mouth daily. For allergies       lancets (ONETOUCH DELICA LANCETS) 30 gauge Misc Inject 1 each under the skin 4 (four) times a day. 300 each 3     metFORMIN (GLUCOPHAGE-XR) 500 MG 24 hr tablet TAKE 2 TABLETS (1,000 MG TOTAL) BY MOUTH 2 (TWO) TIMES A DAY. 120 tablet 5     miscellaneous medical supply Misc CPAP machine for home use at pressure: 14cmw , Heated humidifier x 1, Humidifier chamber x 1, chin strapx1, nasal face mask with cushion x 1, Heated tubing x 1, Headgear x 1, Filters: Disposable x 1pk & Reusable x 1pk, Length of Need: 99 months, Frequency of use: Daily       multivitamin therapeutic tablet Take 1 tablet by mouth daily.       naproxen sodium (ALEVE) 220 MG tablet Take 440 mg by mouth at bedtime.       dulaglutide (TRULICITY) 1.5 mg/0.5 mL PnIj Inject 1.5 mg under the skin every 7 days. 4 Syringe 2     No current facility-administered medications for this visit.          Review of Systems  Constitutional: Denies current constitutional symptoms but noted feeling ill especially on the days he takes his Trulicity injections.  Respiratory: negative  Cardiovascular: negative  Gastrointestinal: negative  Genitourinary:negative  Musculoskeletal:Notes pain to the ankle right side.  No swelling, no other joint problems.  Neurological: As in the history he notes some intermittent numbness noted  around the toes on the left side.      Objective:      /78   Pulse 68   Resp (!) 6   Wt (!) 359 lb 3.2 oz (162.9 kg)   BMI 47.39 kg/m    General appearance: alert, appears stated age, cooperative and moderately obese  Lungs: clear to auscultation bilaterally  Heart: regular rate and rhythm, S1, S2 normal, no murmur, click, rub or gallop  Abdomen: normal findings: bowel sounds normal and soft, non-tender and abnormal findings:  obese  Extremities: extremities normal, atraumatic, no cyanosis or edema  Pulses: 2+ and symmetric  Skin: Skin color, texture, turgor normal. No rashes or lesions  Neurologic: Grossly normal      Diabetic foot exam:   Left: Pulses Dorsalis Pedis:  present  Posterior Tibial:  present   Vibratory sensation normal   Filament test present    Right: Pulses Dorsalis Pedis:  present  Posterior Tibial:  present   Vibratory sensation normal   Filament test present        Patient was evaluated for proper footwear and sizing.    Laboratory:  No components found for: A1C      .

## 2021-06-24 NOTE — TELEPHONE ENCOUNTER
Refill Approved    Rx renewed per Medication Renewal Policy. Medication was last renewed on 12/7/18.    Radha Bhatia, [facility] Triage/Med Refill 2/25/2019     Requested Prescriptions   Pending Prescriptions Disp Refills     TRULICITY 1.5 mg/0.5 mL PnIj [Pharmacy Med Name: TRULICITY 1.5 MG/0.5 ML PEN] 2 Syringe 2     Sig: INJECT 1.5 MG UNDER THE SKIN EVERY 7 DAYS.    Insulin/GLP-1 Refill Protocol Passed - 2/25/2019  1:17 AM       Passed - Visit with PCP or prescribing provider visit in last 6 months    Last office visit with prescriber/PCP: 12/7/2018 OR same dept: 12/7/2018 Ada Guzman MD OR same specialty: 12/7/2018 Ada Guzman MD Last physical: Visit date not found Last MTM visit: Visit date not found     Next appt within 3 mo: Visit date not found  Next physical within 3 mo: Visit date not found  Prescriber OR PCP: Ada Guzman MD  Last diagnosis associated with med order: 1. Diabetes mellitus type 2 in obese (H)  - TRULICITY 1.5 mg/0.5 mL PnIj [Pharmacy Med Name: TRULICITY 1.5 MG/0.5 ML PEN]; INJECT 1.5 MG UNDER THE SKIN EVERY 7 DAYS.  Dispense: 2 Syringe; Refill: 2    If protocol passes may refill for 6 months if within 3 months of last provider visit (or a total of 9 months).             Passed - A1C in last 6 months    Hemoglobin A1c   Date Value Ref Range Status   12/07/2018 10.1 (H) 3.5 - 6.1 % Final              Passed - Microalbumin in last year    Microalbumin, Random Urine   Date Value Ref Range Status   08/28/2018 <0.50 0.00 - 1.99 mg/dL Final                 Passed - Blood pressure in last year    BP Readings from Last 1 Encounters:   12/07/18 138/78            Passed - Creatinine done in last year    Creatinine   Date Value Ref Range Status   12/07/2018 0.84 0.70 - 1.30 mg/dL Final

## 2021-06-24 NOTE — PROGRESS NOTES
Assessment:   1. Uncontrolled type 2 diabetes mellitus without complication, without long-term current use of insulin (H)  - Glycosylated Hemoglobin A1C  - Lipid Cascade  - Comprehensive Metabolic Panel  - Microalbumin, Random Urine  - metFORMIN (GLUCOPHAGE-XR) 500 MG 24 hr tablet; Take 2 tablets (1,000 mg total) by mouth 2 (two) times a day.  Dispense: 360 tablet; Refill: 2  His A1c today came back at 10.3% which is a little higher than previously.  He unfortunately does not want to change a whole lot of his management at this time.  We have offered diabetic education, but he does not want to go there.  We have also try changing different medications and he has had different reactions to those medications.  I think that at this point he apparently may not be understand the nature of the diabetes mellitus and complications.  I had previously given him the option of seeing diabetic educator and possibly having to discuss with the endocrinologist, and now he does not have any signs that he is working hard improvement in his diabetes.  Discussed with the MTM today and he does need to be on insulin.  I am also considering a referral to endocrinologist for evaluation regarding the next steps with management.     Plan:      Discussed general issues about diabetes pathophysiology and management.  Counseling at today's visit: discussed the need for weight loss, discussed the advantages of a diet low in carbohydrates and reminded to check sugars regularly and to bring readings in at the time of the next visit.  Encouraged aerobic exercise.     Subjective:       Mohsen Morales is a 39 y.o. male who presents today for follow-up of diabetes mellitus.  It has been difficult controlling his diabetes mellitus and we have used a different medications for him in the past.  Majority of them he had stated that he is allergic to them because of possible side effects.  Most of the time he feels that the medications make him ill.  Because  of that he is not receptive to any changes.  Today he noted that he has been attempting to lessen the amount of carbohydrates and calories that he is eating he thinks that he is feeling a lot better today than previously.  Because of feeling well he has not been checking his glucose as he should. Current symptoms include: none. Patient denies foot ulcerations, nausea, paresthesia of the feet, polydipsia, polyuria and visual disturbances. Evaluation to date has been: fasting blood sugar, fasting lipid panel, hemoglobin A1C and microalbuminuria. Home sugars: patient does not check sugars. Current treatments: He is currently on Trulicity as well as metformin.. Last dilated eye exam up-to-date..      No past medical history on file.  No past surgical history on file.  Social History     Socioeconomic History     Marital status: Single     Spouse name: None     Number of children: None     Years of education: None     Highest education level: None   Occupational History     None   Social Needs     Financial resource strain: None     Food insecurity:     Worry: None     Inability: None     Transportation needs:     Medical: None     Non-medical: None   Tobacco Use     Smoking status: Never Smoker     Smokeless tobacco: Never Used   Substance and Sexual Activity     Alcohol use: None     Drug use: None     Sexual activity: None   Lifestyle     Physical activity:     Days per week: None     Minutes per session: None     Stress: None   Relationships     Social connections:     Talks on phone: None     Gets together: None     Attends Jainism service: None     Active member of club or organization: None     Attends meetings of clubs or organizations: None     Relationship status: None     Intimate partner violence:     Fear of current or ex partner: None     Emotionally abused: None     Physically abused: None     Forced sexual activity: None   Other Topics Concern     None   Social History Narrative     None     Family  History   Problem Relation Age of Onset     Hypertension Mother      Diabetes Paternal Uncle      Allergies   Allergen Reactions     Amoxicillin Other (See Comments)     Nauseated, upset stomach     Glipizide Nausea And Vomiting     Jardiance [Empagliflozin] Other (See Comments)     dehydated     Penicillins      Current Outpatient Medications   Medication Sig Dispense Refill     blood glucose test (ONETOUCH ULTRA TEST) strips Use 1 each As Directed 6 (six) times a day. 600 each 3     fexofenadine (ALLEGRA) 180 MG tablet Take 180 mg by mouth daily. For allergies       lancets (ONETOUCH DELICA LANCETS) 30 gauge Misc Inject 1 each under the skin 4 (four) times a day. 300 each 3     metFORMIN (GLUCOPHAGE-XR) 500 MG 24 hr tablet Take 2 tablets (1,000 mg total) by mouth 2 (two) times a day. 360 tablet 2     multivitamin therapeutic tablet Take 1 tablet by mouth daily.       naproxen sodium (ALEVE) 220 MG tablet Take 440 mg by mouth at bedtime.       TRULICITY 1.5 mg/0.5 mL PnIj INJECT 1.5 MG UNDER THE SKIN EVERY 7 DAYS. 4 Syringe 6     No current facility-administered medications for this visit.          Review of Systems  A 12 point comprehensive review of systems was negative except as noted.      Objective:      /80   Pulse 76   Resp 22   Wt (!) 355 lb 8 oz (161.3 kg)   BMI 46.90 kg/m        General appearance: alert, appears stated age, cooperative and moderately obese  Lungs: clear to auscultation bilaterally  Heart: regular rate and rhythm, S1, S2 normal, no murmur, click, rub or gallop  Abdomen: normal findings: bowel sounds normal and soft, non-tender and abnormal findings:  obese  Extremities: extremities normal, atraumatic, no cyanosis .  But he does have brownish discoloration noted on the lower legs bilaterally which is a result of venous insufficiency.  There is also mild pedal edema.  Pulses: 2+ and symmetric  Skin: Skin color, texture, turgor normal. No rashes or lesions  Neurologic: Grossly  normal    Diabetic foot exam:   Left: Pulses Dorsalis Pedis:  present  Posterior Tibial:  present   Vibratory sensation normal   Filament test present    Right: Pulses Dorsalis Pedis:  present  Posterior Tibial:  present   Vibratory sensation normal   Filament test present        Patient was evaluated for proper footwear and sizing.    Laboratory:  No components found for: A1C      .

## 2021-08-14 ENCOUNTER — HEALTH MAINTENANCE LETTER (OUTPATIENT)
Age: 41
End: 2021-08-14

## 2021-10-10 ENCOUNTER — HEALTH MAINTENANCE LETTER (OUTPATIENT)
Age: 41
End: 2021-10-10

## 2021-10-17 ENCOUNTER — IMMUNIZATION (OUTPATIENT)
Dept: FAMILY MEDICINE | Facility: CLINIC | Age: 41
End: 2021-10-17
Payer: COMMERCIAL

## 2021-10-17 PROCEDURE — 90686 IIV4 VACC NO PRSV 0.5 ML IM: CPT

## 2021-10-17 PROCEDURE — 90471 IMMUNIZATION ADMIN: CPT

## 2022-01-07 ENCOUNTER — IMMUNIZATION (OUTPATIENT)
Dept: NURSING | Facility: CLINIC | Age: 42
End: 2022-01-07
Payer: COMMERCIAL

## 2022-01-07 PROCEDURE — 91305 COVID-19,PF,PFIZER (12+ YRS): CPT

## 2022-01-07 PROCEDURE — 0054A COVID-19,PF,PFIZER (12+ YRS): CPT

## 2022-03-26 ENCOUNTER — HEALTH MAINTENANCE LETTER (OUTPATIENT)
Age: 42
End: 2022-03-26

## 2022-05-31 ENCOUNTER — OFFICE VISIT (OUTPATIENT)
Dept: INTERNAL MEDICINE | Facility: CLINIC | Age: 42
End: 2022-05-31
Payer: COMMERCIAL

## 2022-05-31 VITALS
WEIGHT: 315 LBS | DIASTOLIC BLOOD PRESSURE: 114 MMHG | BODY MASS INDEX: 42.27 KG/M2 | HEART RATE: 88 BPM | SYSTOLIC BLOOD PRESSURE: 175 MMHG | OXYGEN SATURATION: 97 %

## 2022-05-31 DIAGNOSIS — M54.50 LUMBAR BACK PAIN: ICD-10-CM

## 2022-05-31 DIAGNOSIS — E66.9 DIABETES MELLITUS TYPE 2 IN OBESE: Primary | ICD-10-CM

## 2022-05-31 DIAGNOSIS — E11.69 DIABETES MELLITUS TYPE 2 IN OBESE: Primary | ICD-10-CM

## 2022-05-31 LAB
ALBUMIN SERPL-MCNC: 3.6 G/DL (ref 3.5–5)
ALP SERPL-CCNC: 89 U/L (ref 45–120)
ALT SERPL W P-5'-P-CCNC: 46 U/L (ref 0–45)
ANION GAP SERPL CALCULATED.3IONS-SCNC: 13 MMOL/L (ref 5–18)
AST SERPL W P-5'-P-CCNC: 22 U/L (ref 0–40)
BILIRUB SERPL-MCNC: 1.1 MG/DL (ref 0–1)
BUN SERPL-MCNC: 12 MG/DL (ref 8–22)
CALCIUM SERPL-MCNC: 9.3 MG/DL (ref 8.5–10.5)
CHLORIDE BLD-SCNC: 102 MMOL/L (ref 98–107)
CO2 SERPL-SCNC: 21 MMOL/L (ref 22–31)
CREAT SERPL-MCNC: 0.74 MG/DL (ref 0.7–1.3)
GFR SERPL CREATININE-BSD FRML MDRD: >90 ML/MIN/1.73M2
GLUCOSE BLD-MCNC: 389 MG/DL (ref 70–125)
HBA1C MFR BLD: 10 % (ref 0–5.6)
POTASSIUM BLD-SCNC: 4.3 MMOL/L (ref 3.5–5)
PROT SERPL-MCNC: 6.6 G/DL (ref 6–8)
SODIUM SERPL-SCNC: 136 MMOL/L (ref 136–145)

## 2022-05-31 PROCEDURE — 99213 OFFICE O/P EST LOW 20 MIN: CPT | Performed by: NURSE PRACTITIONER

## 2022-05-31 PROCEDURE — 36415 COLL VENOUS BLD VENIPUNCTURE: CPT | Performed by: NURSE PRACTITIONER

## 2022-05-31 PROCEDURE — 83036 HEMOGLOBIN GLYCOSYLATED A1C: CPT | Performed by: NURSE PRACTITIONER

## 2022-05-31 PROCEDURE — 80053 COMPREHEN METABOLIC PANEL: CPT | Performed by: NURSE PRACTITIONER

## 2022-05-31 RX ORDER — TRAMADOL HYDROCHLORIDE 50 MG/1
50 TABLET ORAL EVERY 6 HOURS PRN
Qty: 20 TABLET | Refills: 0 | Status: SHIPPED | OUTPATIENT
Start: 2022-05-31 | End: 2022-06-06

## 2022-05-31 NOTE — PATIENT INSTRUCTIONS
Prescription for tramadol sent into pharmacy.  Use good Rx coupon for reduced cost.    Update diabetes labs today.    Follow-up with PCP next week for diabetes follow-up/low back pain recheck/neuropathy recheck.

## 2022-05-31 NOTE — PROGRESS NOTES
Assessment & Plan     Diabetes mellitus type 2 in obese (H)  Overdue for diabetic follow-up visit with his PCP.  We will nguyen up those orders now have him follow-up with his PCP next week for recheck  - Hemoglobin A1c; Future  - Comprehensive metabolic panel; Future    Lumbar back pain  Acute severe lumbar back pain with potential radicular symptoms.  Tramadol for pain relief.  Good Rx coupon provided so that he could avoid the prior authorization    Blood pressure elevated today, potentially due to pain.  Recheck with PCP next week    - traMADol (ULTRAM) 50 MG tablet; Take 1 tablet (50 mg) by mouth every 6 hours as needed for severe pain    Patient Instructions   Prescription for tramadol sent into pharmacy.  Use good Rx coupon for reduced cost.    Update diabetes labs today.    Follow-up with PCP next week for diabetes follow-up/low back pain recheck/neuropathy recheck.      Prescription drug management  10 minutes spent on the date of the encounter doing chart review, history and exam, documentation and further activities per the note       See Patient Instructions    Return in about 1 week (around 6/7/2022).    Roni Alegre, [facility]    Tom Connolly is a 42 year old who presents for the following health issues     HPI     Acute onset low back pain started within the last week.  Shortly prior to that, he describes having a burning type sensation in bilateral thighs.    He thinks that the pain in his thighs is related to his underlying type 2 diabetes.  He has had issues with metformin in the past.  Not currently using his Trulicity.    Has lost some weight over the last couple of years but is still morbidly obese.  Not checking his blood sugars.    Denies loss of bowel or bladder function.  No saddle anesthesia.    No precipitating injury that spurred on his low back pain.    Pain is severe.  Having trouble sleeping.  Taking Tylenol and Aleve      Review of  Systems   Constitutional, HEENT, cardiovascular, pulmonary, gi and gu systems are negative, except as otherwise noted.      Objective    BP (!) 185/118   Pulse 88   Wt 145.3 kg (320 lb 6.4 oz)   SpO2 97%   BMI 42.27 kg/m    Body mass index is 42.27 kg/m .  Physical Exam     Leg lift test is normal bilaterally.  Able to climb up onto the exam table without much difficulty.  Localizes the pain to the right SI joint area

## 2022-06-06 ENCOUNTER — OFFICE VISIT (OUTPATIENT)
Dept: FAMILY MEDICINE | Facility: CLINIC | Age: 42
End: 2022-06-06
Payer: COMMERCIAL

## 2022-06-06 VITALS
BODY MASS INDEX: 41.89 KG/M2 | WEIGHT: 315 LBS | HEART RATE: 80 BPM | SYSTOLIC BLOOD PRESSURE: 178 MMHG | DIASTOLIC BLOOD PRESSURE: 104 MMHG

## 2022-06-06 DIAGNOSIS — E11.9 TYPE 2 DIABETES MELLITUS WITHOUT COMPLICATION, WITHOUT LONG-TERM CURRENT USE OF INSULIN (H): Primary | ICD-10-CM

## 2022-06-06 DIAGNOSIS — M54.50 ACUTE BILATERAL LOW BACK PAIN WITHOUT SCIATICA: ICD-10-CM

## 2022-06-06 DIAGNOSIS — E66.813 CLASS 3 SEVERE OBESITY WITH SERIOUS COMORBIDITY AND BODY MASS INDEX (BMI) OF 45.0 TO 49.9 IN ADULT, UNSPECIFIED OBESITY TYPE (H): ICD-10-CM

## 2022-06-06 DIAGNOSIS — N50.89 TESTICULAR NODULE: ICD-10-CM

## 2022-06-06 DIAGNOSIS — E66.01 CLASS 3 SEVERE OBESITY WITH SERIOUS COMORBIDITY AND BODY MASS INDEX (BMI) OF 45.0 TO 49.9 IN ADULT, UNSPECIFIED OBESITY TYPE (H): ICD-10-CM

## 2022-06-06 PROCEDURE — 99214 OFFICE O/P EST MOD 30 MIN: CPT | Performed by: FAMILY MEDICINE

## 2022-06-06 RX ORDER — GABAPENTIN 300 MG/1
300 CAPSULE ORAL AT BEDTIME
Qty: 30 CAPSULE | Refills: 1 | Status: SHIPPED | OUTPATIENT
Start: 2022-06-06 | End: 2022-06-29 | Stop reason: DRUGHIGH

## 2022-06-06 RX ORDER — ACETAMINOPHEN 500 MG
1000 TABLET ORAL EVERY 6 HOURS PRN
COMMUNITY

## 2022-06-06 RX ORDER — ACETAMINOPHEN AND CODEINE PHOSPHATE 300; 30 MG/1; MG/1
1 TABLET ORAL 2 TIMES DAILY PRN
Qty: 20 TABLET | Refills: 0 | Status: SHIPPED | OUTPATIENT
Start: 2022-06-06 | End: 2022-06-10

## 2022-06-06 RX ORDER — GLUCOSAMINE HCL/CHONDROITIN SU 500-400 MG
CAPSULE ORAL
Qty: 100 EACH | Refills: 3 | Status: SHIPPED | OUTPATIENT
Start: 2022-06-06

## 2022-06-06 RX ORDER — LANCETS
EACH MISCELLANEOUS
Qty: 200 EACH | Refills: 1 | Status: SHIPPED | OUTPATIENT
Start: 2022-06-06

## 2022-06-06 RX ORDER — TRAMADOL HYDROCHLORIDE 50 MG/1
50 TABLET ORAL EVERY 6 HOURS PRN
Qty: 20 TABLET | Refills: 0 | Status: SHIPPED | OUTPATIENT
Start: 2022-06-06 | End: 2022-06-10

## 2022-06-06 RX ORDER — METFORMIN HCL 500 MG
1000 TABLET, EXTENDED RELEASE 24 HR ORAL 2 TIMES DAILY
Qty: 360 TABLET | Refills: 1 | Status: SHIPPED | OUTPATIENT
Start: 2022-06-06 | End: 2024-03-01

## 2022-06-06 ASSESSMENT — ENCOUNTER SYMPTOMS
WEAKNESS: 0
PARESTHESIAS: 1
BACK PAIN: 1
DIZZINESS: 0
ACTIVITY CHANGE: 1
JOINT SWELLING: 0
FATIGUE: 1
FEVER: 0
CARDIOVASCULAR NEGATIVE: 1
RESPIRATORY NEGATIVE: 1

## 2022-06-06 NOTE — PROGRESS NOTES
Assessment & Plan     Type 2 diabetes mellitus without complication, without long-term current use of insulin (H)  - metFORMIN (GLUCOPHAGE XR) 500 MG 24 hr tablet  Dispense: 360 tablet; Refill: 1  - blood glucose monitoring (NO BRAND SPECIFIED) meter device kit  Dispense: 1 kit; Refill: 0  - blood glucose (NO BRAND SPECIFIED) test strip  Dispense: 100 strip; Refill: 6  - thin (NO BRAND SPECIFIED) lancets  Dispense: 200 each; Refill: 1  - alcohol swab prep pads  Dispense: 100 each; Refill: 3    Acute bilateral low back pain without sciatica (worse on the right side)  - acetaminophen-codeine (TYLENOL W/CODEINE #3) 300-30 MG per tablet  Dispense: 20 tablet; Refill: 0  - XR Lumbar Spine 2/3 Views  - traMADol (ULTRAM) 50 MG tablet  Dispense: 20 tablet; Refill: 0  - gabapentin (NEURONTIN) 300 MG capsule  Dispense: 30 capsule; Refill: 1    Testicular nodule  - US Testicular & Scrotum w Doppler Ltd    Class 3 severe obesity with serious comorbidity and body mass index (BMI) of 45.0 to 49.9 in adult, unspecified obesity type (H)     Will restart him on medications with the A1c done end of May at 10.0%. He will need to have another medication in order to control the diabetes better. I will refer him to MT.  But his main concern today is his back pain. Will get an xray and review that, consider referral to [facility]. Refilled his pain medication. Added Gabapentin and Tylenol 3.   Will also get an Ultrasound to recheck for the previous testicular abnormality.               No follow-ups on file.    Ada Guzman MD  [facility]    Tom Connolly is a 42 year old who presents for the following health issues     Back Pain   This is a new problem. The current episode started more than 2 days ago (started about 5 days ago. No injuries noted.). The problem occurs constantly. The problem has been rapidly worsening. The pain is associated with no known injury. The pain is present in the  lumbar spine. The quality of the pain is described as aching and burning. The pain is moderate. The symptoms are aggravated by certain positions. Associated symptoms include paresthesias. Pertinent negatives include no fever and no weakness. Associated symptoms comments: Notes some tingling on the thighs which is worse on the right as the back pain is also worse on the right.. Risk factors include obesity.   History of Present Illness       Reason for visit:  Severe pain in my right side and tailbone.  (Has diabetes mellithis type with Obesity and has been off of his medication Metformin since 2019 accodring to him due to Covid-19. Tested his A1c 5 days ago and it was still high at 10%. Wants to getback on the Metformin for now. )  Symptom onset:  1-2 weeks ago  Symptoms include:  Tailbone and side pain, neuropathy in thighs and legs. Had a left testicular U/S finding that he was supposed to recheck , but he also did not  do that.  Symptom intensity:  Moderate  Symptom progression:  Worsening  Had these symptoms before:  No  What makes it worse:  Sitting  What makes it better:  Exercise    He eats 0-1 servings of fruits and vegetables daily.He consumes 1 sweetened beverage(s) daily.He exercises with enough effort to increase his heart rate 9 or less minutes per day.  He exercises with enough effort to increase his heart rate 3 or less days per week.   He is taking medications regularly.         Family History   Problem Relation Age of Onset     Hypertension Mother      Diabetes Paternal Uncle       Social History     Socioeconomic History     Marital status:      Spouse name: Not on file     Number of children: Not on file     Years of education: Not on file     Highest education level: Not on file   Occupational History     Not on file   Tobacco Use     Smoking status: Never Smoker     Smokeless tobacco: Never Used   Substance and Sexual Activity     Alcohol use: Not on file     Drug use: Not on file      Sexual activity: Not on file   Other Topics Concern     Not on file   Social History Narrative     Not on file     Social Determinants of Health     Financial Resource Strain: Not on file   Food Insecurity: Not on file   Transportation Needs: Not on file   Physical Activity: Not on file   Stress: Not on file   Social Connections: Not on file   Intimate Partner Violence: Not on file   Housing Stability: Not on file      No past surgical history on file.   No past medical history on file.         Review of Systems   Constitutional: Positive for activity change and fatigue. Negative for fever.   Respiratory: Negative.    Cardiovascular: Negative.    Genitourinary: Negative for scrotal swelling and testicular pain.   Musculoskeletal: Positive for back pain and gait problem. Negative for joint swelling.   Neurological: Positive for paresthesias. Negative for dizziness, syncope and weakness.            Objective    BP (!) 166/92 (BP Location: Left arm, Patient Position: Sitting, Cuff Size: Adult Large)   Pulse 80   Wt 144 kg (317 lb 8 oz)   BMI 41.89 kg/m    Body mass index is 41.89 kg/m .  Physical Exam  Constitutional:       General: He is in acute distress.      Appearance: He is obese.      Comments: In some distress due to back pain.   Cardiovascular:      Rate and Rhythm: Normal rate.      Pulses: Normal pulses.   Pulmonary:      Effort: Pulmonary effort is normal.   Musculoskeletal:      Comments: Antalgic gait due to back pain    Neurological:      Mental Status: He is alert.

## 2022-06-10 ENCOUNTER — MYC REFILL (OUTPATIENT)
Dept: FAMILY MEDICINE | Facility: CLINIC | Age: 42
End: 2022-06-10
Payer: COMMERCIAL

## 2022-06-10 DIAGNOSIS — M54.50 ACUTE BILATERAL LOW BACK PAIN WITHOUT SCIATICA: ICD-10-CM

## 2022-06-11 NOTE — TELEPHONE ENCOUNTER
Routing refill request to provider for review/approval because:  Controlled substance request    Last Written Prescription Date:  6/6/22  Last Fill Quantity: 20,  # refills: 0   Last office visit provider:  6/6/22     Requested Prescriptions   Pending Prescriptions Disp Refills     acetaminophen-codeine (TYLENOL W/CODEINE #3) 300-30 MG per tablet 20 tablet 0     Sig: Take 1 tablet by mouth 2 times daily as needed for moderate to severe pain       There is no refill protocol information for this order        traMADol (ULTRAM) 50 MG tablet 20 tablet 0     Sig: Take 1 tablet (50 mg) by mouth every 6 hours as needed for severe pain       There is no refill protocol information for this order          Starr Miller 06/11/22 12:43 PM

## 2022-06-14 ENCOUNTER — HOSPITAL ENCOUNTER (OUTPATIENT)
Dept: ULTRASOUND IMAGING | Facility: CLINIC | Age: 42
Discharge: HOME | End: 2022-06-14
Attending: FAMILY MEDICINE | Admitting: FAMILY MEDICINE
Payer: COMMERCIAL

## 2022-06-14 DIAGNOSIS — N50.89 TESTICULAR NODULE: ICD-10-CM

## 2022-06-14 PROCEDURE — 76870 US EXAM SCROTUM: CPT

## 2022-06-14 RX ORDER — TRAMADOL HYDROCHLORIDE 50 MG/1
50 TABLET ORAL EVERY 6 HOURS PRN
Qty: 20 TABLET | Refills: 0 | Status: SHIPPED | OUTPATIENT
Start: 2022-06-14 | End: 2022-06-29

## 2022-06-14 RX ORDER — ACETAMINOPHEN AND CODEINE PHOSPHATE 300; 30 MG/1; MG/1
1 TABLET ORAL 2 TIMES DAILY PRN
Qty: 20 TABLET | Refills: 0 | Status: SHIPPED | OUTPATIENT
Start: 2022-06-14 | End: 2022-06-29 | Stop reason: ALTCHOICE

## 2022-06-14 NOTE — TELEPHONE ENCOUNTER
Please inform him that I put in a referral to [facility] for more evaluation and management of the back pain.

## 2022-06-20 NOTE — PROGRESS NOTES
Assessment:     Diagnoses and all orders for this visit:  Right lumbar radiculopathy  -     MR Lumbar Spine w/o Contrast; Future  -     gabapentin (NEURONTIN) 300 MG capsule; Take 3 capsules (900 mg) by mouth 3 times daily Follow Dosing Chart  -     predniSONE (DELTASONE) 10 MG tablet; Prednisone 10 mg tablet, 3 tablets p.o. daily for 3 days, then 2 tablets p.o. daily for 3 days, then 1 tablet p.o. daily for 3 days then stop.  -     HYDROcodone-acetaminophen (NORCO) 5-325 MG tablet; Take 1 tablet by mouth every 6 hours as needed for severe pain Do not drive while taking  Acute bilateral low back pain without sciatica (worse on the right side)  -     Spine Referral  -     MR Lumbar Spine w/o Contrast; Future  -     gabapentin (NEURONTIN) 300 MG capsule; Take 3 capsules (900 mg) by mouth 3 times daily Follow Dosing Chart  -     predniSONE (DELTASONE) 10 MG tablet; Prednisone 10 mg tablet, 3 tablets p.o. daily for 3 days, then 2 tablets p.o. daily for 3 days, then 1 tablet p.o. daily for 3 days then stop.  -     HYDROcodone-acetaminophen (NORCO) 5-325 MG tablet; Take 1 tablet by mouth every 6 hours as needed for severe pain Do not drive while taking     Mohsen Morales is a 42 year old y.o. male with past medical history significant for type 2 diabetes mellitus, severe obesity, ARY, vitamin D deficiency, who presents today for new patient eval regarding:    -Acute right low back pain with right lumbar radiculopathy L3 and L4 dermatomal pattern with paresthesias and perceived weakness.  Patient is neurologically intact on exam.     Plan:     A shared decision making plan was used. The patient's values and choices were respected. Prior medical records were reviewed today. The following represents what was discussed and decided upon by the provider and the patient.        -DIAGNOSTIC TESTS: Images were personally reviewed and interpreted.   -- Ordered lumbar spine MRI to further evaluate lumbar radiculopathy for potential  intervention due to severe pain.  -- Lumbar spine x-ray 6/6/2022 with straightening of lumbar lordosis with mild degenerative changes.    -INTERVENTIONS: Consider RIGHT lumbar ALFREDO versus surgical intervention pending imaging review.    -MEDICATIONS: Advised patient to discontinue Tylenol 3 as well as tramadol and prescribed hydrocodone 5/325 mg 1 tablet every 6 hours as needed for severe breakthrough pain number 28 tablets given for 7 days worth.  Did discuss that if the hydrocodone is not as effective we can switch him back to Tylenol 3 for moderate pain and tramadol for severe breakthrough pain.  Prescribe gabapentin 300 mg to titrate up to 3 tablets 3 times daily as he is currently just taking 1 tablet at bedtime.  Prescribed Medrol Dosepak and advised patient to avoid OTC NSAIDs specifically Aleve which he was taking too high of a dose for as well which we discussed that once he is done with the Medrol Dosepak he can go back to Aleve 500 mg 3 times daily as needed at the max level.  Discussed side effects of medications and proper use. Patient verbalized understanding.    -PHYSICAL THERAPY: Consider physical therapy if medications are improving his current symptoms however currently his pain is severe and he would not tolerate physical therapy.  Discussed the importance of core strengthening, ROM, stretching exercises with the patient and how each of these entities is important in decreasing pain.  Explained to the patient that the purpose of physical therapy is to teach the patient a home exercise program.  These exercises need to be performed every day in order to decrease pain and prevent future occurrences of pain.        -PATIENT EDUCATION:  Total time of 46 minutes, on the day of service, spent with the patient, reviewing the chart, placing orders, and documenting.   -Today we also discussed the issues related to the current COVID-19 pandemic, the pros and cons of the current treatment plan, the CDC  guidelines such as social distancing, washing the hands, and covering the cough.    -FOLLOW UP: Follow-up for Jennifer message for MRI review which she is okay with  Advised to contact clinic if symptoms worsen or change.    Subjective:     Mohsen Morales is a 42 year old male who presents today for follow-up regarding acute right low back pain the lower lumbar spine that radiates to the right buttock mild pain into the posterior thigh but most intense pain into the anterior thigh and some into the lateral thigh mostly that stops at the knee, occasionally some odd sensations he reports into the lateral shin however.  Currently does report numbness and tingling and his pain is fairly severe up to a 10/10 when he is not taking his medications, does get to a 4/10 when he is taking Aleve, Tylenol 3, tramadol, and gabapentin alternating.  However he reports that once the medications wears off his pain becomes severe again.  He does feel slight perceived weakness in his right lower extremity, denies any episodes of his leg giving out on him.  Minimal prior back pain history.  Patient denies bowel or bladder loss control, denies saddle anesthesia    Patient reports that he is had a 50 pound weight loss in the last 6 months which is why he is a little frustrated that he is now having these back issues as he was trying to avoid this by weight loss.    Treatment to Date: No prior spinal surgery or spinal injections    Medications:  Aleve to 220 mg  Gabapentin 300 mg  Tramadol 50 mg 6/14/2022  Tylenol with codeine 6/14/2022    Patient Active Problem List   Diagnosis     Class 3 severe obesity with serious comorbidity and body mass index (BMI) of 45.0 to 49.9 in adult, unspecified obesity type (H)     Obstructive sleep apnea     Vitamin D Deficiency     Allergies     Diabetes mellitus type 2 in obese (H)     Ankle Sprain       Current Outpatient Medications   Medication     acetaminophen-codeine (TYLENOL W/CODEINE #3) 300-30 MG  per tablet     gabapentin (NEURONTIN) 300 MG capsule     gabapentin (NEURONTIN) 300 MG capsule     HYDROcodone-acetaminophen (NORCO) 5-325 MG tablet     Naproxen Sodium (ALEVE PO)     predniSONE (DELTASONE) 10 MG tablet     traMADol (ULTRAM) 50 MG tablet     acetaminophen (TYLENOL) 500 MG tablet     alcohol swab prep pads     blood glucose (NO BRAND SPECIFIED) test strip     blood glucose monitoring (NO BRAND SPECIFIED) meter device kit     blood glucose test (ONETOUCH ULTRA TEST) strips     fexofenadine (ALLEGRA) 180 MG tablet     lancets (ONETOUCH DELICA LANCETS) 30 gauge Misc     metFORMIN (GLUCOPHAGE XR) 500 MG 24 hr tablet     multivitamin therapeutic tablet     omeprazole (PRILOSEC) 20 MG capsule     thin (NO BRAND SPECIFIED) lancets     UNABLE TO FIND     No current facility-administered medications for this visit.       Allergies   Allergen Reactions     Amoxicillin Other (See Comments)     Nauseated, upset stomach     Glipizide Nausea and Vomiting     Jardiance [Empagliflozin] Other (See Comments)     dehydated     Penicillins Unknown       No past medical history on file.     Review of Systems  ROS: Patient is , works as a .  Patient denies smoking/tobacco use, denies alcohol use, denies history being heavy drinker, denies recreational drug use.  Specifically negative for bowel/bladder dysfunction, balance changes, headache, dizziness, foot drop, fevers, chills, appetite changes, nausea/vomiting, unexplained weight loss. Otherwise 13 systems reviewed are negative. Please see the patient's intake questionnaire from today for details.    Reviewed Social, Family, Past Medical and Past Surgical history with patient, no significant changes noted since prior visit.     Objective:     BP (!) 174/93 (BP Location: Right arm, Patient Position: Sitting)   Pulse 80   SpO2 97%     PHYSICAL EXAMINATION:    --CONSTITUTIONAL: Well developed, well nourished, healthy appearing  individual.  --PSYCHIATRIC: Appropriate mood and affect. No difficulty interacting due to temper, social withdrawal, or memory issues.  --SKIN: Lumbar region is dry and intact.   --RESPIRATORY: Normal rhythm and effort. No abnormal accessory muscle breathing patterns noted.   --MUSCULOSKELETAL:  Normal lumbar lordosis noted, no lateral shift.  --GROSS MOTOR: Easily arises from a seated position. Gait is non-antalgic  --LUMBAR SPINE:  Inspection reveals no evidence of deformity. Range of motion is limited in lumbar extension and lateral rotation. No tenderness to palpation lumbar spine. Straight leg raising is negative to radicular pain. Sciatic notch non-tender.   --LOWER EXTREMITY MOTOR TESTING:  Plantar flexion left 5/5, right 5/5   Dorsiflexion left 5/5, right 5/5   Great toe MTP extension left 5/5, right 5/5  Knee flexion left 5/5, right 5/5  Knee extension left 5/5, right 5/5   Hip flexion left 5/5, right 5/5  Hip abduction left 5/5, right 5/5  Hip adduction left 5/5, right 5/5   --HIPS: Full range of motion bilaterally. .   --NEUROLOGIC: Bilateral patellar and achilles reflexes are physiologic and symmetric. Sensation to light touch is intact in the bilateral L4, L5, and S1 dermatomes.    RESULTS:   Imaging: Lumbar spine imaging was reviewed today. The images were shown to the patient and the findings were explained using a spine model.    US Testicular & Scrotum w Doppler Ltd  Result Date: 6/14/2022  EXAM: US TESTICULAR AND SCROTUM WITH DOPPLER LIMITED LOCATION: [facility] DATE/TIME: 6/14/2022 2:37 PM INDICATION: Left Testicular Nodule, needing recheck. COMPARISON: 09/20/2019. TECHNIQUE: Ultrasound of scrotum with color flow and spectral Doppler with waveform analysis performed. FINDINGS: RIGHT: Right testicle measures 4.7 x 3.2 x 2.4 cm. Normal testicle with no masses. Normal arterial duplex and normal color flow. Normal epididymis. No hydrocele. No varicocele. LEFT: Left testicle  measures 4.4 x 2.9 x 2.4 cm. No change in a 2 mm echogenic focus of the left testicle, probably a benign calcification. No concerning testicular mass. Normal arterial duplex and normal color flow. Normal epididymis. No hydrocele. No varicocele.   IMPRESSION: Stable tiny echogenic focus of the left testicle probably a benign calcification. No concerning testicular mass.    XR Lumbar Spine 2/3 Views  Result Date: 6/6/2022  EXAM: XR LUMBAR SPINE 2-3 VIEWS LOCATION: [facility] DATE/TIME: 6/6/2022 10:31 AM INDICATION: Lower back pain COMPARISON: None. TECHNIQUE: CR Lumbar Spine.   IMPRESSION: Normal vertebral body heights. Straightening of lumbar lordosis. Mild degenerative changes. No definite fracture.

## 2022-06-21 ENCOUNTER — OFFICE VISIT (OUTPATIENT)
Dept: PHYSICAL MEDICINE AND REHAB | Facility: CLINIC | Age: 42
End: 2022-06-21
Payer: COMMERCIAL

## 2022-06-21 VITALS — OXYGEN SATURATION: 97 % | DIASTOLIC BLOOD PRESSURE: 93 MMHG | SYSTOLIC BLOOD PRESSURE: 174 MMHG | HEART RATE: 80 BPM

## 2022-06-21 DIAGNOSIS — M54.16 RIGHT LUMBAR RADICULOPATHY: Primary | ICD-10-CM

## 2022-06-21 DIAGNOSIS — M54.50 ACUTE BILATERAL LOW BACK PAIN WITHOUT SCIATICA: ICD-10-CM

## 2022-06-21 PROCEDURE — 99214 OFFICE O/P EST MOD 30 MIN: CPT | Performed by: NURSE PRACTITIONER

## 2022-06-21 RX ORDER — GABAPENTIN 300 MG/1
900 CAPSULE ORAL 3 TIMES DAILY
Qty: 270 CAPSULE | Refills: 3 | Status: SHIPPED | OUTPATIENT
Start: 2022-06-21 | End: 2022-08-24

## 2022-06-21 RX ORDER — PREDNISONE 10 MG/1
TABLET ORAL
Qty: 18 TABLET | Refills: 0 | Status: SHIPPED | OUTPATIENT
Start: 2022-06-21 | End: 2022-06-29

## 2022-06-21 RX ORDER — HYDROCODONE BITARTRATE AND ACETAMINOPHEN 5; 325 MG/1; MG/1
1 TABLET ORAL EVERY 6 HOURS PRN
Qty: 28 TABLET | Refills: 0 | Status: SHIPPED | OUTPATIENT
Start: 2022-06-21 | End: 2022-06-28

## 2022-06-21 ASSESSMENT — PAIN SCALES - GENERAL: PAINLEVEL: SEVERE PAIN (7)

## 2022-06-21 NOTE — LETTER
6/21/2022         RE: Mohsen Morales  [address] [place] [address]        Dear Colleague,    Thank you for referring your patient, Mohsen Morales, to the [facility]. Please see a copy of my visit note below.      Assessment:     Diagnoses and all orders for this visit:  Right lumbar radiculopathy  -     MR Lumbar Spine w/o Contrast; Future  -     gabapentin (NEURONTIN) 300 MG capsule; Take 3 capsules (900 mg) by mouth 3 times daily Follow Dosing Chart  -     predniSONE (DELTASONE) 10 MG tablet; Prednisone 10 mg tablet, 3 tablets p.o. daily for 3 days, then 2 tablets p.o. daily for 3 days, then 1 tablet p.o. daily for 3 days then stop.  -     HYDROcodone-acetaminophen (NORCO) 5-325 MG tablet; Take 1 tablet by mouth every 6 hours as needed for severe pain Do not drive while taking  Acute bilateral low back pain without sciatica (worse on the right side)  -     Spine Referral  -     MR Lumbar Spine w/o Contrast; Future  -     gabapentin (NEURONTIN) 300 MG capsule; Take 3 capsules (900 mg) by mouth 3 times daily Follow Dosing Chart  -     predniSONE (DELTASONE) 10 MG tablet; Prednisone 10 mg tablet, 3 tablets p.o. daily for 3 days, then 2 tablets p.o. daily for 3 days, then 1 tablet p.o. daily for 3 days then stop.  -     HYDROcodone-acetaminophen (NORCO) 5-325 MG tablet; Take 1 tablet by mouth every 6 hours as needed for severe pain Do not drive while taking     Mohsen Morales is a 42 year old y.o. male with past medical history significant for type 2 diabetes mellitus, severe obesity, ARY, vitamin D deficiency, who presents today for new patient eval regarding:    -Acute right low back pain with right lumbar radiculopathy L3 and L4 dermatomal pattern with paresthesias and perceived weakness.  Patient is neurologically intact on exam.     Plan:     A shared decision making plan was used. The patient's values and choices were respected. Prior medical records were reviewed today.  The following represents what was discussed and decided upon by the provider and the patient.        -DIAGNOSTIC TESTS: Images were personally reviewed and interpreted.   -- Ordered lumbar spine MRI to further evaluate lumbar radiculopathy for potential intervention due to severe pain.  -- Lumbar spine x-ray 6/6/2022 with straightening of lumbar lordosis with mild degenerative changes.    -INTERVENTIONS: Consider RIGHT lumbar ALFREDO versus surgical intervention pending imaging review.    -MEDICATIONS: Advised patient to discontinue Tylenol 3 as well as tramadol and prescribed hydrocodone 5/325 mg 1 tablet every 6 hours as needed for severe breakthrough pain number 28 tablets given for 7 days worth.  Did discuss that if the hydrocodone is not as effective we can switch him back to Tylenol 3 for moderate pain and tramadol for severe breakthrough pain.  Prescribe gabapentin 300 mg to titrate up to 3 tablets 3 times daily as he is currently just taking 1 tablet at bedtime.  Prescribed Medrol Dosepak and advised patient to avoid OTC NSAIDs specifically Aleve which he was taking too high of a dose for as well which we discussed that once he is done with the Medrol Dosepak he can go back to Aleve 500 mg 3 times daily as needed at the max level.  Discussed side effects of medications and proper use. Patient verbalized understanding.    -PHYSICAL THERAPY: Consider physical therapy if medications are improving his current symptoms however currently his pain is severe and he would not tolerate physical therapy.  Discussed the importance of core strengthening, ROM, stretching exercises with the patient and how each of these entities is important in decreasing pain.  Explained to the patient that the purpose of physical therapy is to teach the patient a home exercise program.  These exercises need to be performed every day in order to decrease pain and prevent future occurrences of pain.        -PATIENT EDUCATION:  Total time of 46  minutes, on the day of service, spent with the patient, reviewing the chart, placing orders, and documenting.   -Today we also discussed the issues related to the current COVID-19 pandemic, the pros and cons of the current treatment plan, the CDC guidelines such as social distancing, washing the hands, and covering the cough.    -FOLLOW UP: Follow-up for Jennifer message for MRI review which she is okay with  Advised to contact clinic if symptoms worsen or change.    Subjective:     Mohsen Morales is a 42 year old male who presents today for follow-up regarding acute right low back pain the lower lumbar spine that radiates to the right buttock mild pain into the posterior thigh but most intense pain into the anterior thigh and some into the lateral thigh mostly that stops at the knee, occasionally some odd sensations he reports into the lateral shin however.  Currently does report numbness and tingling and his pain is fairly severe up to a 10/10 when he is not taking his medications, does get to a 4/10 when he is taking Aleve, Tylenol 3, tramadol, and gabapentin alternating.  However he reports that once the medications wears off his pain becomes severe again.  He does feel slight perceived weakness in his right lower extremity, denies any episodes of his leg giving out on him.  Minimal prior back pain history.  Patient denies bowel or bladder loss control, denies saddle anesthesia    Patient reports that he is had a 50 pound weight loss in the last 6 months which is why he is a little frustrated that he is now having these back issues as he was trying to avoid this by weight loss.    Treatment to Date: No prior spinal surgery or spinal injections    Medications:  Aleve to 220 mg  Gabapentin 300 mg  Tramadol 50 mg 6/14/2022  Tylenol with codeine 6/14/2022    Patient Active Problem List   Diagnosis     Class 3 severe obesity with serious comorbidity and body mass index (BMI) of 45.0 to 49.9 in adult, unspecified  obesity type (H)     Obstructive sleep apnea     Vitamin D Deficiency     Allergies     Diabetes mellitus type 2 in obese (H)     Ankle Sprain       Current Outpatient Medications   Medication     acetaminophen-codeine (TYLENOL W/CODEINE #3) 300-30 MG per tablet     gabapentin (NEURONTIN) 300 MG capsule     gabapentin (NEURONTIN) 300 MG capsule     HYDROcodone-acetaminophen (NORCO) 5-325 MG tablet     Naproxen Sodium (ALEVE PO)     predniSONE (DELTASONE) 10 MG tablet     traMADol (ULTRAM) 50 MG tablet     acetaminophen (TYLENOL) 500 MG tablet     alcohol swab prep pads     blood glucose (NO BRAND SPECIFIED) test strip     blood glucose monitoring (NO BRAND SPECIFIED) meter device kit     blood glucose test (ONETOUCH ULTRA TEST) strips     fexofenadine (ALLEGRA) 180 MG tablet     lancets (ONETOUCH DELICA LANCETS) 30 gauge Misc     metFORMIN (GLUCOPHAGE XR) 500 MG 24 hr tablet     multivitamin therapeutic tablet     omeprazole (PRILOSEC) 20 MG capsule     thin (NO BRAND SPECIFIED) lancets     UNABLE TO FIND     No current facility-administered medications for this visit.       Allergies   Allergen Reactions     Amoxicillin Other (See Comments)     Nauseated, upset stomach     Glipizide Nausea and Vomiting     Jardiance [Empagliflozin] Other (See Comments)     dehydated     Penicillins Unknown       No past medical history on file.     Review of Systems  ROS: Patient is , works as a .  Patient denies smoking/tobacco use, denies alcohol use, denies history being heavy drinker, denies recreational drug use.  Specifically negative for bowel/bladder dysfunction, balance changes, headache, dizziness, foot drop, fevers, chills, appetite changes, nausea/vomiting, unexplained weight loss. Otherwise 13 systems reviewed are negative. Please see the patient's intake questionnaire from today for details.    Reviewed Social, Family, Past Medical and Past Surgical history with patient, no significant  changes noted since prior visit.     Objective:     BP (!) 174/93 (BP Location: Right arm, Patient Position: Sitting)   Pulse 80   SpO2 97%     PHYSICAL EXAMINATION:    --CONSTITUTIONAL: Well developed, well nourished, healthy appearing individual.  --PSYCHIATRIC: Appropriate mood and affect. No difficulty interacting due to temper, social withdrawal, or memory issues.  --SKIN: Lumbar region is dry and intact.   --RESPIRATORY: Normal rhythm and effort. No abnormal accessory muscle breathing patterns noted.   --MUSCULOSKELETAL:  Normal lumbar lordosis noted, no lateral shift.  --GROSS MOTOR: Easily arises from a seated position. Gait is non-antalgic  --LUMBAR SPINE:  Inspection reveals no evidence of deformity. Range of motion is limited in lumbar extension and lateral rotation. No tenderness to palpation lumbar spine. Straight leg raising is negative to radicular pain. Sciatic notch non-tender.   --LOWER EXTREMITY MOTOR TESTING:  Plantar flexion left 5/5, right 5/5   Dorsiflexion left 5/5, right 5/5   Great toe MTP extension left 5/5, right 5/5  Knee flexion left 5/5, right 5/5  Knee extension left 5/5, right 5/5   Hip flexion left 5/5, right 5/5  Hip abduction left 5/5, right 5/5  Hip adduction left 5/5, right 5/5   --HIPS: Full range of motion bilaterally. .   --NEUROLOGIC: Bilateral patellar and achilles reflexes are physiologic and symmetric. Sensation to light touch is intact in the bilateral L4, L5, and S1 dermatomes.    RESULTS:   Imaging: Lumbar spine imaging was reviewed today. The images were shown to the patient and the findings were explained using a spine model.    US Testicular & Scrotum w Doppler Ltd  Result Date: 6/14/2022  EXAM: US TESTICULAR AND SCROTUM WITH DOPPLER LIMITED LOCATION: [facility] DATE/TIME: 6/14/2022 2:37 PM INDICATION: Left Testicular Nodule, needing recheck. COMPARISON: 09/20/2019. TECHNIQUE: Ultrasound of scrotum with color flow and spectral Doppler  with waveform analysis performed. FINDINGS: RIGHT: Right testicle measures 4.7 x 3.2 x 2.4 cm. Normal testicle with no masses. Normal arterial duplex and normal color flow. Normal epididymis. No hydrocele. No varicocele. LEFT: Left testicle measures 4.4 x 2.9 x 2.4 cm. No change in a 2 mm echogenic focus of the left testicle, probably a benign calcification. No concerning testicular mass. Normal arterial duplex and normal color flow. Normal epididymis. No hydrocele. No varicocele.   IMPRESSION: Stable tiny echogenic focus of the left testicle probably a benign calcification. No concerning testicular mass.    XR Lumbar Spine 2/3 Views  Result Date: 6/6/2022  EXAM: XR LUMBAR SPINE 2-3 VIEWS LOCATION: [facility] DATE/TIME: 6/6/2022 10:31 AM INDICATION: Lower back pain COMPARISON: None. TECHNIQUE: CR Lumbar Spine.   IMPRESSION: Normal vertebral body heights. Straightening of lumbar lordosis. Mild degenerative changes. No definite fracture.                            Again, thank you for allowing me to participate in the care of your patient.        Sincerely,        Wen Jennings, CNP

## 2022-06-21 NOTE — PATIENT INSTRUCTIONS
~[facility] scheduling #[phone].  ~Please call our [facility] Nurse Navigation line [phone] with any questions or concerns about your treatment plan, if symptoms worsen and you would like to be seen urgently, or if you have problems controlling bladder and bowel function.     A Medrol Dose Pack (Prednisone Taper) was prescribed today for your acute pain. Please follow the dosing instruction on prescription bottle.     POSSIBLE STEROID SIDE EFFECTS   -If you experience these, it should only last for a short period-   Swelling   Increased appetite   Skin redness (flushness)   Skin rash   Mouth (oral) irritation   Blood sugar (glucose) levels   Sweats   Mood changes     Prescribed Gabapentin today, 300 mg tablets, to be titrated up to 3 tablets 3 times a day as tolerated for your nerve pain. Please follow Gabapentin dosing chart below.    Gabapentin 300mg Dosing Chart    DATE  MORNING AFTERNOON BEDTIME    Day 5 1 0 1    Day 6 1 0 1    Day 7 1 1 1    Day 8 1 1 1    Day 9 1 1 1    Day 10 1 1 2    Day 11 1 1 2    Day 12 1 1 2    Day 13 2 1 2    Day 14 2 1 2    Day 15 2 1 2    Day 16 2 2 2    Day 17 2 2 2    Day 18 2 2 2    Day 19 2 2 3    Day 20 2 2 3    Day 21 2 2 3    Day 22 3 2 3    Day 23 3 2 3    Day 24 3 2 3    Day 25 3 3 3    Day 26 3 3 3    Day 27 3 3 3     Continue medication, taking 3 capsules three times daily  Please call if you have any questions regarding how to take your medication      Gabapentin Dosing Chart    DATE  MORNING AFTERNOON BEDTIME     1 0 1     1 1 1     1 1 2     2 1 2     2 2 2     2 2 3     3 2 3     3 3 3        You have been prescribed a controlled substance medication today for pain control.   This medication must be taken as prescribed only as it can be very dangerous to your health if taken more than prescribed.  We discussed the risks (eg, addiction, overdose, worsening pain, death) verses the potential benefit of opioid medication use. Explained  that this medication will not be a long term solution to ongoing pain. Discussed using lowest effective dose and the importance of other measures for pain management including physical therapy, other non-opioid medications, behavioral treatments, acupuncture and massage, and other procedure options.     **For any further refills on opioid pain medication you must schedule, in advance, an in person clinic or video visit to discuss refill further, in which you may or may not receive refills.   Wen Jennnigs CNP is in clinic Monday - Thursdays. This medication must be refilled by one provider only. Last minute appointments may not be able to be accommodated.        Imaging has been ordered. Radiology will call you to schedule. Please call below if you do not hear from them in the next couple of days.     [facility] Scheduling    Please call [phone] to schedule your image(s) (select option #1). There are 3 different locations, see below.     [facility]  [address] [id number]    [facility] [place]  [address], [address]   [facility] [id number]    [place] [id number] [place] [address]

## 2022-06-23 ENCOUNTER — HOSPITAL ENCOUNTER (OUTPATIENT)
Dept: MRI IMAGING | Facility: HOSPITAL | Age: 42
Discharge: HOME | End: 2022-06-23
Attending: NURSE PRACTITIONER | Admitting: NURSE PRACTITIONER
Payer: COMMERCIAL

## 2022-06-23 DIAGNOSIS — M54.16 RIGHT LUMBAR RADICULOPATHY: ICD-10-CM

## 2022-06-23 DIAGNOSIS — M54.50 ACUTE BILATERAL LOW BACK PAIN WITHOUT SCIATICA: ICD-10-CM

## 2022-06-23 PROCEDURE — 72148 MRI LUMBAR SPINE W/O DYE: CPT

## 2022-06-29 ENCOUNTER — OFFICE VISIT (OUTPATIENT)
Dept: PHYSICAL MEDICINE AND REHAB | Facility: CLINIC | Age: 42
End: 2022-06-29
Payer: COMMERCIAL

## 2022-06-29 VITALS — SYSTOLIC BLOOD PRESSURE: 150 MMHG | DIASTOLIC BLOOD PRESSURE: 78 MMHG | HEART RATE: 98 BPM | OXYGEN SATURATION: 96 %

## 2022-06-29 DIAGNOSIS — E13.44: Primary | ICD-10-CM

## 2022-06-29 DIAGNOSIS — M79.18 RIGHT BUTTOCK PAIN: ICD-10-CM

## 2022-06-29 DIAGNOSIS — M54.50 ACUTE BILATERAL LOW BACK PAIN WITHOUT SCIATICA: ICD-10-CM

## 2022-06-29 PROCEDURE — 99214 OFFICE O/P EST MOD 30 MIN: CPT | Performed by: NURSE PRACTITIONER

## 2022-06-29 RX ORDER — METHOCARBAMOL 500 MG/1
500 TABLET, FILM COATED ORAL 4 TIMES DAILY PRN
Qty: 30 TABLET | Refills: 3 | Status: SHIPPED | OUTPATIENT
Start: 2022-06-29 | End: 2022-08-24

## 2022-06-29 RX ORDER — HYDROCODONE BITARTRATE AND ACETAMINOPHEN 5; 325 MG/1; MG/1
1 TABLET ORAL 2 TIMES DAILY PRN
Qty: 10 TABLET | Refills: 0 | Status: SHIPPED | OUTPATIENT
Start: 2022-06-29 | End: 2022-07-04

## 2022-06-29 ASSESSMENT — PAIN SCALES - GENERAL: PAINLEVEL: SEVERE PAIN (6)

## 2022-06-29 NOTE — PROGRESS NOTES
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)  Acute bilateral low back pain without sciatica (worse on the right side)  -     Physical Therapy Referral; Future  -     methocarbamol (ROBAXIN) 500 MG tablet; Take 1 tablet (500 mg) by mouth 4 times daily as needed for muscle spasms  -     HYDROcodone-acetaminophen (NORCO) 5-325 MG tablet; Take 1 tablet by mouth 2 times daily as needed for severe pain Do not drive while taking  Right buttock pain  -     Physical Therapy Referral; Future  -     methocarbamol (ROBAXIN) 500 MG tablet; Take 1 tablet (500 mg) by mouth 4 times daily as needed for muscle spasms     Mohsen Morales is a 42 year old y.o. male with past medical history significant for type 2 diabetes mellitus, severe obesity, ARY, vitamin D deficiency who presents today for follow-up regarding:    -Significant right low back pain radiating to the right anterior thigh with associated numbness and tingling, some relief with oral steroid and gabapentin.  MRI with no explanation for significant/acute symptoms.  Differential diagnosis at this time is more likely Diabetic Amyotrophy.    *Hemoglobin A1c 3/31/2022 was 10.0, it has been in the 9-11 region for the last 4 years.  Patient was advised to follow-up with primary care provider, may benefit from endocrine referral.     Plan:     A shared decision making plan was used. The patient's values and choices were respected. Prior medical records were reviewed today. The following represents what was discussed and decided upon by the provider and the patient.        -DIAGNOSTIC TESTS: Images were personally reviewed and interpreted.   --Could consider RIGHT lower extremity EMG down the road, patient defers today as he does feel symptoms are improving.  -- Lumbar spine MRI 6/23/2022, overall unremarkable imaging, mild degenerative changes with facet arthropathy.  Relatively normal  alignment and disc height otherwise, no high-grade nerve compression at any level.  --Lumbar spine x-ray 6/6/2022 with straightening of lumbar lordosis with mild degenerative changes.    -INTERVENTIONS: No recommendations for spine injections at this time.  With the right buttock pain we could consider a right sacroiliac joint steroid injection down the road however again symptoms more likely diabetic amyotrophy, in which spine injections would not benefit him.    -MEDICATIONS: Refilled hydrocodone 5/325 mg 1 tablet twice daily for severe breakthrough pain number 10 tablets given for 5 days worth.  Patient is aware that this not medication we will continue prescribing and if he does feel he continues to need refills on this medication for managing his pain he should follow-up with his primary care provider.  Also recommend trialing a muscle relaxant methocarbamol 3-4 times daily as needed.  -I do not recommend prescription or over-the-counter NSAIDs due to uncontrolled diabetes and the risk of kidney disease issues down the road with these medications.  Discussed side effects of medications and proper use. Patient verbalized understanding.    -PHYSICAL THERAPY: Referral placed to physical therapy for right low back pain and leg pain.  Discussed the importance of core strengthening, ROM, stretching exercises with the patient and how each of these entities is important in decreasing pain.  Explained to the patient that the purpose of physical therapy is to teach the patient a home exercise program.  These exercises need to be performed every day in order to decrease pain and prevent future occurrences of pain.        -PATIENT EDUCATION:  Total time of 32 minutes, on the day of service, spent with the patient, reviewing the chart, placing orders, and documenting.   -Today we also discussed the issues related to the current COVID-19 pandemic, the pros and cons of the current treatment plan, the CDC guidelines such as  social distancing, washing the hands, and covering the cough.    -FOLLOW UP: Follow-up as needed  Advised to contact clinic if symptoms worsen or change.    Subjective:     Mohsen Morales is a 42 year old male who presents today for follow-up regarding ongoing right low back pain that radiates to the right lateral hip right buttock and right anterior thigh with associated numbness and tingling.  He does report that most intense is in the right buttock and in the right thigh and he does have a lot of sensitivities to even his clothing and that right thigh.  Does report symptoms have slightly improved since Medrol Dosepak.  Currently pain is a 6/10, 9 at its worst, 3 at its best.  He is still taking hydrocodone unfortunately on a regular basis at this time, minimal benefit with gabapentin high-dose.    Treatment to Date: No prior spinal surgery or spinal injections     Medications:  Aleve to 220 mg  Gabapentin 300 mg  Tramadol 50 mg 6/14/2022  Tylenol with codeine 6/14/2022    Patient Active Problem List   Diagnosis     Class 3 severe obesity with serious comorbidity and body mass index (BMI) of 45.0 to 49.9 in adult, unspecified obesity type (H)     Obstructive sleep apnea     Vitamin D Deficiency     Allergies     Diabetes mellitus type 2 in obese (H)     Ankle Sprain       Current Outpatient Medications   Medication     gabapentin (NEURONTIN) 300 MG capsule     HYDROcodone-acetaminophen (NORCO) 5-325 MG tablet     methocarbamol (ROBAXIN) 500 MG tablet     acetaminophen (TYLENOL) 500 MG tablet     alcohol swab prep pads     blood glucose (NO BRAND SPECIFIED) test strip     blood glucose monitoring (NO BRAND SPECIFIED) meter device kit     blood glucose test (ONETOUCH ULTRA TEST) strips     fexofenadine (ALLEGRA) 180 MG tablet     lancets (ONETOUCH DELICA LANCETS) 30 gauge Misc     metFORMIN (GLUCOPHAGE XR) 500 MG 24 hr tablet     multivitamin therapeutic tablet     Naproxen Sodium (ALEVE PO)     omeprazole (PRILOSEC)  20 MG capsule     thin (NO BRAND SPECIFIED) lancets     UNABLE TO FIND     No current facility-administered medications for this visit.       Allergies   Allergen Reactions     Amoxicillin Other (See Comments)     Nauseated, upset stomach     Glipizide Nausea and Vomiting     Jardiance [Empagliflozin] Other (See Comments)     dehydated     Penicillins Unknown       No past medical history on file.     Review of Systems  ROS:  Specifically negative for bowel/bladder dysfunction, balance changes, headache, dizziness, foot drop, fevers, chills, appetite changes, nausea/vomiting, unexplained weight loss. Otherwise 13 systems reviewed are negative. Please see the patient's intake questionnaire from today for details.    Reviewed Social, Family, Past Medical and Past Surgical history with patient, no significant changes noted since prior visit.     Objective:     BP (!) 173/93 (BP Location: Right arm, Patient Position: Sitting)   Pulse 98   SpO2 96%     PHYSICAL EXAMINATION:    --CONSTITUTIONAL: Well developed, well nourished, healthy appearing individual.  --PSYCHIATRIC: Appropriate mood and affect. No difficulty interacting due to temper, social withdrawal, or memory issues.  --SKIN: Lumbar region is dry and intact.   --RESPIRATORY: Normal rhythm and effort. No abnormal accessory muscle breathing patterns noted.   --MUSCULOSKELETAL:  Normal lumbar lordosis noted, no lateral shift.  --GROSS MOTOR: Easily arises from a seated position. Gait is non-antalgic  --LUMBAR SPINE:  Inspection reveals no evidence of deformity. Range of motion is not limited in lumbar flexion, extension, lateral rotation. No tenderness to palpation lumbar spine. Straight leg raising in the supine position is negative to radicular pain. Sciatic notch nontender on the left, mildly tender on the right.    RESULTS:   Imaging: Lumbar spine imaging was reviewed today. The images were shown to the patient and the findings were explained using a spine  model.    US Testicular & Scrotum w Doppler Ltd    Result Date: 6/14/2022  EXAM: US TESTICULAR AND SCROTUM WITH DOPPLER LIMITED LOCATION: [facility] DATE/TIME: 6/14/2022 2:37 PM INDICATION: Left Testicular Nodule, needing recheck. COMPARISON: 09/20/2019. TECHNIQUE: Ultrasound of scrotum with color flow and spectral Doppler with waveform analysis performed. FINDINGS: RIGHT: Right testicle measures 4.7 x 3.2 x 2.4 cm. Normal testicle with no masses. Normal arterial duplex and normal color flow. Normal epididymis. No hydrocele. No varicocele. LEFT: Left testicle measures 4.4 x 2.9 x 2.4 cm. No change in a 2 mm echogenic focus of the left testicle, probably a benign calcification. No concerning testicular mass. Normal arterial duplex and normal color flow. Normal epididymis. No hydrocele. No varicocele.     IMPRESSION: Stable tiny echogenic focus of the left testicle probably a benign calcification. No concerning testicular mass.    XR Lumbar Spine 2/3 Views    Result Date: 6/6/2022  EXAM: XR LUMBAR SPINE 2-3 VIEWS LOCATION: [facility] DATE/TIME: 6/6/2022 10:31 AM INDICATION: Lower back pain COMPARISON: None. TECHNIQUE: CR Lumbar Spine.     IMPRESSION: Normal vertebral body heights. Straightening of lumbar lordosis. Mild degenerative changes. No definite fracture.      MR Lumbar Spine w/o Contrast  Result Date: 6/23/2022  EXAM: MR LUMBAR SPINE W/O CONTRAST LOCATION: [facility] DATE/TIME: 6/23/2022 12:41 PM INDICATION: Low back pain, > 6 wks. COMPARISON: Plain film evaluation 06/06/2022. TECHNIQUE: Routine Lumbar Spine MRI without IV contrast. FINDINGS: Nomenclature is based on 5 lumbar type vertebral bodies. Normal vertebral body heights, alignment and marrow signal. No evidence for a marrow infiltrative process. No compression fractures. There is low-level marrow edema and some slight atrophy involving the left multifidus/dorsal paraspinous  musculature at and below L5-S1 interspace level. This could represent denervation edema or mild myositis. No focal fluid collections are noted. No additional marrow or ligamentous edema. Satisfactory sacral alignment. Nothing for sacral insufficiency or stress fracture. Normal distal spinal cord and cauda equina with conus medullaris at L1. No expansive change of the conus medullaris/lower cord. Nerve roots of the cauda equina are satisfactory without nodularity or thickening. No morphologic evidence to suggest arachnoiditis. No retroperitoneal solid mass or adenopathy. Symmetric psoas appearance bilaterally. T12-L1: Mild disc desiccation and loss of disc height with no disc herniation. No spinal stenosis or foraminal compromise. Satisfactory facet joint appearance. L1-L2: Mild disc desiccation. Minimal annular bulge. No disc herniation. No spinal stenosis or foraminal compromise. Satisfactory facet joint appearance. L2-L3: Mild disc desiccation with annular bulge. No disc herniation. Facet joint arthropathy left more than right without spinal stenosis or foraminal compromise. Annular bulge does flattens the ventral aspect of the thecal sac. L3-L4: Mild loss of disc height with generalized disc bulge. No spinal stenosis or foraminal compromise. No disc herniation. Mild increased joint space fluid within the facet joints noted bilaterally. L4-L5: Mild disc desiccation with generalized disc bulging. No disc herniation. No spinal stenosis. There may be mild bilateral foraminal narrowing inferiorly. Mild facet joint hypertrophic changes. L5-S1: Mild loss of disc height, with satisfactory disc signal. No herniation. Mild facet joint hypertrophic change.. No spinal canal or neural foraminal stenosis.   IMPRESSION: 1.  Mild interspace narrowing and/or annular bulge/generalized disc bulging noted. 2.  No disc herniations. 3.  No spinal stenosis at any lumbar level. 4.  There may be mild foraminal narrowing bilaterally  L4-L5. 5.  See above for additional details and full description.      US Testicular & Scrotum w Doppler Ltd  Result Date: 6/14/2022  EXAM: US TESTICULAR AND SCROTUM WITH DOPPLER LIMITED LOCATION: [facility] DATE/TIME: 6/14/2022 2:37 PM INDICATION: Left Testicular Nodule, needing recheck. COMPARISON: 09/20/2019. TECHNIQUE: Ultrasound of scrotum with color flow and spectral Doppler with waveform analysis performed. FINDINGS: RIGHT: Right testicle measures 4.7 x 3.2 x 2.4 cm. Normal testicle with no masses. Normal arterial duplex and normal color flow. Normal epididymis. No hydrocele. No varicocele. LEFT: Left testicle measures 4.4 x 2.9 x 2.4 cm. No change in a 2 mm echogenic focus of the left testicle, probably a benign calcification. No concerning testicular mass. Normal arterial duplex and normal color flow. Normal epididymis. No hydrocele. No varicocele.   IMPRESSION: Stable tiny echogenic focus of the left testicle probably a benign calcification. No concerning testicular mass.     XR Lumbar Spine 2/3 Views  Result Date: 6/6/2022  EXAM: XR LUMBAR SPINE 2-3 VIEWS LOCATION: [facility] DATE/TIME: 6/6/2022 10:31 AM INDICATION: Lower back pain COMPARISON: None. TECHNIQUE: CR Lumbar Spine.   IMPRESSION: Normal vertebral body heights. Straightening of lumbar lordosis. Mild degenerative changes. No definite fracture.

## 2022-06-29 NOTE — PATIENT INSTRUCTIONS
~[facility] scheduling #[phone].  ~Please call our [facility] Nurse Navigation line [phone] with any questions or concerns about your treatment plan, if symptoms worsen and you would like to be seen urgently, or if you have problems controlling bladder and bowel function.       ~You have been referred for Physical Therapy to [facility]. They will call you to schedule an appointment.      Scheduling phone number is [phone] [facility], or [place] location.  If you have not heard from the scheduling office within 2 business days, please call [phone] for ALL other locations.    Discussed the importance of core strengthening, ROM, stretching exercises and how each of these entities is important in decreasing pain and improving long term spine health.  The purpose of physical therapy is to teach you an individualized home exercise program.  These exercises need to be performed every day in order to decrease pain and prevent future occurrences of pain.           You have been prescribed a controlled substance medication today for pain control.   This medication must be taken as prescribed only as it can be very dangerous to your health if taken more than prescribed.  We discussed the risks (eg, addiction, overdose, worsening pain, death) verses the potential benefit of opioid medication use. Explained that this medication will not be a long term solution to ongoing pain. Discussed using lowest effective dose and the importance of other measures for pain management including physical therapy, other non-opioid medications, behavioral treatments, acupuncture and massage, and other procedure options.     **For any further refills on opioid pain medication you must schedule, in advance, an in person clinic or video visit to discuss refill further, in which you may or may not receive refills.   Wen Jennings CNP is in  clinic Monday - Thursdays. This medication must be refilled by one provider only. Last minute appointments may not be able to be accommodated.

## 2022-06-29 NOTE — LETTER
6/29/2022         RE: Mohsen Morales  [address] [place] [address]        Dear Colleague,    Thank you for referring your patient, Mohsen Morales, to the [facility]. Please see a copy of my visit note below.      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)  Acute bilateral low back pain without sciatica (worse on the right side)  -     Physical Therapy Referral; Future  -     methocarbamol (ROBAXIN) 500 MG tablet; Take 1 tablet (500 mg) by mouth 4 times daily as needed for muscle spasms  -     HYDROcodone-acetaminophen (NORCO) 5-325 MG tablet; Take 1 tablet by mouth 2 times daily as needed for severe pain Do not drive while taking  Right buttock pain  -     Physical Therapy Referral; Future  -     methocarbamol (ROBAXIN) 500 MG tablet; Take 1 tablet (500 mg) by mouth 4 times daily as needed for muscle spasms     Mohsen Morales is a 42 year old y.o. male with past medical history significant for type 2 diabetes mellitus, severe obesity, ARY, vitamin D deficiency who presents today for follow-up regarding:    -Significant right low back pain radiating to the right anterior thigh with associated numbness and tingling, some relief with oral steroid and gabapentin.  MRI with no explanation for significant/acute symptoms.  Differential diagnosis at this time is more likely Diabetic Amyotrophy.    *Hemoglobin A1c 3/31/2022 was 10.0, it has been in the 9-11 region for the last 4 years.  Patient was advised to follow-up with primary care provider, may benefit from endocrine referral.     Plan:     A shared decision making plan was used. The patient's values and choices were respected. Prior medical records were reviewed today. The following represents what was discussed and decided upon by the provider and the patient.        -DIAGNOSTIC TESTS: Images were personally reviewed and interpreted.    --Could consider RIGHT lower extremity EMG down the road, patient defers today as he does feel symptoms are improving.  -- Lumbar spine MRI 6/23/2022, overall unremarkable imaging, mild degenerative changes with facet arthropathy.  Relatively normal alignment and disc height otherwise, no high-grade nerve compression at any level.  --Lumbar spine x-ray 6/6/2022 with straightening of lumbar lordosis with mild degenerative changes.    -INTERVENTIONS: No recommendations for spine injections at this time.  With the right buttock pain we could consider a right sacroiliac joint steroid injection down the road however again symptoms more likely diabetic amyotrophy, in which spine injections would not benefit him.    -MEDICATIONS: Refilled hydrocodone 5/325 mg 1 tablet twice daily for severe breakthrough pain number 10 tablets given for 5 days worth.  Patient is aware that this not medication we will continue prescribing and if he does feel he continues to need refills on this medication for managing his pain he should follow-up with his primary care provider.  Also recommend trialing a muscle relaxant methocarbamol 3-4 times daily as needed.  -I do not recommend prescription or over-the-counter NSAIDs due to uncontrolled diabetes and the risk of kidney disease issues down the road with these medications.  Discussed side effects of medications and proper use. Patient verbalized understanding.    -PHYSICAL THERAPY: Referral placed to physical therapy for right low back pain and leg pain.  Discussed the importance of core strengthening, ROM, stretching exercises with the patient and how each of these entities is important in decreasing pain.  Explained to the patient that the purpose of physical therapy is to teach the patient a home exercise program.  These exercises need to be performed every day in order to decrease pain and prevent future occurrences of pain.        -PATIENT EDUCATION:  Total time of 32 minutes, on the  day of service, spent with the patient, reviewing the chart, placing orders, and documenting.   -Today we also discussed the issues related to the current COVID-19 pandemic, the pros and cons of the current treatment plan, the CDC guidelines such as social distancing, washing the hands, and covering the cough.    -FOLLOW UP: Follow-up as needed  Advised to contact clinic if symptoms worsen or change.    Subjective:     Mohsen Morales is a 42 year old male who presents today for follow-up regarding ongoing right low back pain that radiates to the right lateral hip right buttock and right anterior thigh with associated numbness and tingling.  He does report that most intense is in the right buttock and in the right thigh and he does have a lot of sensitivities to even his clothing and that right thigh.  Does report symptoms have slightly improved since Medrol Dosepak.  Currently pain is a 6/10, 9 at its worst, 3 at its best.  He is still taking hydrocodone unfortunately on a regular basis at this time, minimal benefit with gabapentin high-dose.    Treatment to Date: No prior spinal surgery or spinal injections     Medications:  Aleve to 220 mg  Gabapentin 300 mg  Tramadol 50 mg 6/14/2022  Tylenol with codeine 6/14/2022    Patient Active Problem List   Diagnosis     Class 3 severe obesity with serious comorbidity and body mass index (BMI) of 45.0 to 49.9 in adult, unspecified obesity type (H)     Obstructive sleep apnea     Vitamin D Deficiency     Allergies     Diabetes mellitus type 2 in obese (H)     Ankle Sprain       Current Outpatient Medications   Medication     gabapentin (NEURONTIN) 300 MG capsule     HYDROcodone-acetaminophen (NORCO) 5-325 MG tablet     methocarbamol (ROBAXIN) 500 MG tablet     acetaminophen (TYLENOL) 500 MG tablet     alcohol swab prep pads     blood glucose (NO BRAND SPECIFIED) test strip     blood glucose monitoring (NO BRAND SPECIFIED) meter device kit     blood glucose test (ONETOUCH  ULTRA TEST) strips     fexofenadine (ALLEGRA) 180 MG tablet     lancets (ONETOUCH DELICA LANCETS) 30 gauge Misc     metFORMIN (GLUCOPHAGE XR) 500 MG 24 hr tablet     multivitamin therapeutic tablet     Naproxen Sodium (ALEVE PO)     omeprazole (PRILOSEC) 20 MG capsule     thin (NO BRAND SPECIFIED) lancets     UNABLE TO FIND     No current facility-administered medications for this visit.       Allergies   Allergen Reactions     Amoxicillin Other (See Comments)     Nauseated, upset stomach     Glipizide Nausea and Vomiting     Jardiance [Empagliflozin] Other (See Comments)     dehydated     Penicillins Unknown       No past medical history on file.     Review of Systems  ROS:  Specifically negative for bowel/bladder dysfunction, balance changes, headache, dizziness, foot drop, fevers, chills, appetite changes, nausea/vomiting, unexplained weight loss. Otherwise 13 systems reviewed are negative. Please see the patient's intake questionnaire from today for details.    Reviewed Social, Family, Past Medical and Past Surgical history with patient, no significant changes noted since prior visit.     Objective:     BP (!) 173/93 (BP Location: Right arm, Patient Position: Sitting)   Pulse 98   SpO2 96%     PHYSICAL EXAMINATION:    --CONSTITUTIONAL: Well developed, well nourished, healthy appearing individual.  --PSYCHIATRIC: Appropriate mood and affect. No difficulty interacting due to temper, social withdrawal, or memory issues.  --SKIN: Lumbar region is dry and intact.   --RESPIRATORY: Normal rhythm and effort. No abnormal accessory muscle breathing patterns noted.   --MUSCULOSKELETAL:  Normal lumbar lordosis noted, no lateral shift.  --GROSS MOTOR: Easily arises from a seated position. Gait is non-antalgic  --LUMBAR SPINE:  Inspection reveals no evidence of deformity. Range of motion is not limited in lumbar flexion, extension, lateral rotation. No tenderness to palpation lumbar spine. Straight leg raising in the supine  position is negative to radicular pain. Sciatic notch nontender on the left, mildly tender on the right.    RESULTS:   Imaging: Lumbar spine imaging was reviewed today. The images were shown to the patient and the findings were explained using a spine model.    US Testicular & Scrotum w Doppler Ltd    Result Date: 6/14/2022  EXAM: US TESTICULAR AND SCROTUM WITH DOPPLER LIMITED LOCATION: [facility] DATE/TIME: 6/14/2022 2:37 PM INDICATION: Left Testicular Nodule, needing recheck. COMPARISON: 09/20/2019. TECHNIQUE: Ultrasound of scrotum with color flow and spectral Doppler with waveform analysis performed. FINDINGS: RIGHT: Right testicle measures 4.7 x 3.2 x 2.4 cm. Normal testicle with no masses. Normal arterial duplex and normal color flow. Normal epididymis. No hydrocele. No varicocele. LEFT: Left testicle measures 4.4 x 2.9 x 2.4 cm. No change in a 2 mm echogenic focus of the left testicle, probably a benign calcification. No concerning testicular mass. Normal arterial duplex and normal color flow. Normal epididymis. No hydrocele. No varicocele.     IMPRESSION: Stable tiny echogenic focus of the left testicle probably a benign calcification. No concerning testicular mass.    XR Lumbar Spine 2/3 Views    Result Date: 6/6/2022  EXAM: XR LUMBAR SPINE 2-3 VIEWS LOCATION: [facility] DATE/TIME: 6/6/2022 10:31 AM INDICATION: Lower back pain COMPARISON: None. TECHNIQUE: CR Lumbar Spine.     IMPRESSION: Normal vertebral body heights. Straightening of lumbar lordosis. Mild degenerative changes. No definite fracture.      MR Lumbar Spine w/o Contrast  Result Date: 6/23/2022  EXAM: MR LUMBAR SPINE W/O CONTRAST LOCATION: [facility] DATE/TIME: 6/23/2022 12:41 PM INDICATION: Low back pain, > 6 wks. COMPARISON: Plain film evaluation 06/06/2022. TECHNIQUE: Routine Lumbar Spine MRI without IV contrast. FINDINGS: Nomenclature is based on 5 lumbar type vertebral  bodies. Normal vertebral body heights, alignment and marrow signal. No evidence for a marrow infiltrative process. No compression fractures. There is low-level marrow edema and some slight atrophy involving the left multifidus/dorsal paraspinous musculature at and below L5-S1 interspace level. This could represent denervation edema or mild myositis. No focal fluid collections are noted. No additional marrow or ligamentous edema. Satisfactory sacral alignment. Nothing for sacral insufficiency or stress fracture. Normal distal spinal cord and cauda equina with conus medullaris at L1. No expansive change of the conus medullaris/lower cord. Nerve roots of the cauda equina are satisfactory without nodularity or thickening. No morphologic evidence to suggest arachnoiditis. No retroperitoneal solid mass or adenopathy. Symmetric psoas appearance bilaterally. T12-L1: Mild disc desiccation and loss of disc height with no disc herniation. No spinal stenosis or foraminal compromise. Satisfactory facet joint appearance. L1-L2: Mild disc desiccation. Minimal annular bulge. No disc herniation. No spinal stenosis or foraminal compromise. Satisfactory facet joint appearance. L2-L3: Mild disc desiccation with annular bulge. No disc herniation. Facet joint arthropathy left more than right without spinal stenosis or foraminal compromise. Annular bulge does flattens the ventral aspect of the thecal sac. L3-L4: Mild loss of disc height with generalized disc bulge. No spinal stenosis or foraminal compromise. No disc herniation. Mild increased joint space fluid within the facet joints noted bilaterally. L4-L5: Mild disc desiccation with generalized disc bulging. No disc herniation. No spinal stenosis. There may be mild bilateral foraminal narrowing inferiorly. Mild facet joint hypertrophic changes. L5-S1: Mild loss of disc height, with satisfactory disc signal. No herniation. Mild facet joint hypertrophic change.. No spinal canal or neural  foraminal stenosis.   IMPRESSION: 1.  Mild interspace narrowing and/or annular bulge/generalized disc bulging noted. 2.  No disc herniations. 3.  No spinal stenosis at any lumbar level. 4.  There may be mild foraminal narrowing bilaterally L4-L5. 5.  See above for additional details and full description.      US Testicular & Scrotum w Doppler Ltd  Result Date: 6/14/2022  EXAM: US TESTICULAR AND SCROTUM WITH DOPPLER LIMITED LOCATION: [facility] DATE/TIME: 6/14/2022 2:37 PM INDICATION: Left Testicular Nodule, needing recheck. COMPARISON: 09/20/2019. TECHNIQUE: Ultrasound of scrotum with color flow and spectral Doppler with waveform analysis performed. FINDINGS: RIGHT: Right testicle measures 4.7 x 3.2 x 2.4 cm. Normal testicle with no masses. Normal arterial duplex and normal color flow. Normal epididymis. No hydrocele. No varicocele. LEFT: Left testicle measures 4.4 x 2.9 x 2.4 cm. No change in a 2 mm echogenic focus of the left testicle, probably a benign calcification. No concerning testicular mass. Normal arterial duplex and normal color flow. Normal epididymis. No hydrocele. No varicocele.   IMPRESSION: Stable tiny echogenic focus of the left testicle probably a benign calcification. No concerning testicular mass.     XR Lumbar Spine 2/3 Views  Result Date: 6/6/2022  EXAM: XR LUMBAR SPINE 2-3 VIEWS LOCATION: [facility] DATE/TIME: 6/6/2022 10:31 AM INDICATION: Lower back pain COMPARISON: None. TECHNIQUE: CR Lumbar Spine.   IMPRESSION: Normal vertebral body heights. Straightening of lumbar lordosis. Mild degenerative changes. No definite fracture.                           Again, thank you for allowing me to participate in the care of your patient.        Sincerely,        Wen Jennings, CNP

## 2022-07-02 DIAGNOSIS — E11.9 TYPE 2 DIABETES MELLITUS WITHOUT COMPLICATION, WITHOUT LONG-TERM CURRENT USE OF INSULIN (H): ICD-10-CM

## 2022-07-02 RX ORDER — BLOOD-GLUCOSE METER
EACH MISCELLANEOUS
OUTPATIENT
Start: 2022-07-02

## 2022-08-24 ENCOUNTER — OFFICE VISIT (OUTPATIENT)
Dept: FAMILY MEDICINE | Facility: CLINIC | Age: 42
End: 2022-08-24
Payer: COMMERCIAL

## 2022-08-24 VITALS
WEIGHT: 307.31 LBS | SYSTOLIC BLOOD PRESSURE: 140 MMHG | BODY MASS INDEX: 40.73 KG/M2 | DIASTOLIC BLOOD PRESSURE: 88 MMHG | HEIGHT: 73 IN | OXYGEN SATURATION: 96 % | HEART RATE: 78 BPM

## 2022-08-24 DIAGNOSIS — M79.18 RIGHT BUTTOCK PAIN: ICD-10-CM

## 2022-08-24 DIAGNOSIS — M54.16 RIGHT LUMBAR RADICULOPATHY: ICD-10-CM

## 2022-08-24 DIAGNOSIS — E66.01 CLASS 3 SEVERE OBESITY WITH SERIOUS COMORBIDITY AND BODY MASS INDEX (BMI) OF 45.0 TO 49.9 IN ADULT, UNSPECIFIED OBESITY TYPE (H): ICD-10-CM

## 2022-08-24 DIAGNOSIS — E11.69 DIABETES MELLITUS TYPE 2 IN OBESE: ICD-10-CM

## 2022-08-24 DIAGNOSIS — M54.41 ACUTE BILATERAL LOW BACK PAIN WITH RIGHT-SIDED SCIATICA: Primary | ICD-10-CM

## 2022-08-24 DIAGNOSIS — E66.813 CLASS 3 SEVERE OBESITY WITH SERIOUS COMORBIDITY AND BODY MASS INDEX (BMI) OF 45.0 TO 49.9 IN ADULT, UNSPECIFIED OBESITY TYPE (H): ICD-10-CM

## 2022-08-24 DIAGNOSIS — E66.9 DIABETES MELLITUS TYPE 2 IN OBESE: ICD-10-CM

## 2022-08-24 PROCEDURE — 99214 OFFICE O/P EST MOD 30 MIN: CPT | Performed by: FAMILY MEDICINE

## 2022-08-24 RX ORDER — METHOCARBAMOL 500 MG/1
500 TABLET, FILM COATED ORAL DAILY PRN
Qty: 30 TABLET | Refills: 0 | Status: SHIPPED | OUTPATIENT
Start: 2022-08-24 | End: 2024-03-01

## 2022-08-24 RX ORDER — GABAPENTIN 300 MG/1
900 CAPSULE ORAL 3 TIMES DAILY
Qty: 270 CAPSULE | Refills: 3 | Status: SHIPPED | OUTPATIENT
Start: 2022-08-24 | End: 2024-03-01

## 2022-08-24 ASSESSMENT — PAIN SCALES - GENERAL: PAINLEVEL: MODERATE PAIN (4)

## 2022-08-24 NOTE — PROGRESS NOTES
"  Assessment & Plan     Acute bilateral low back pain with sciatica (worse on the right side)  Right lumbar radiculopathy  Long visit today reviewing his sciatica issues which sound like they are slowly improving from early July and no longer on the narcotics.  Reviewed spine clinic notes from June. He would like a short-term refill of the muscle relaxer which we talked about weaning down as well as gabapentin with a similar goal to eventually wean this down as it has a side effect risk for weight gain- understands to decrease by 300mg every few days as able.   We did review that there is a possibility that the diabetes may be contributing to his nerve tingling as the note from the spine clinic talked about diabetic amyotrophy as a differential diagnosis with the MRI showing no significant findings.  -He declines need for physical therapy referral at this time though we did review that this can be ordered for him at any point down the road as it is a often mainstay and prevention for recurrent symptoms  - gabapentin (NEURONTIN) 300 MG capsule; Take 3 capsules (900 mg) by mouth 3 times daily Follow Dosing Chart  - methocarbamol (ROBAXIN) 500 MG tablet; Take 1 tablet (500 mg) by mouth daily as needed for muscle spasms     Diabetes mellitus type 2 in obese (H)  Sounds like he has had fairly uncontrolled diabetes for the last 4 years and then A1c 9 to 11% range.  He indicates \"I do not think my diabetes is out of control because I feel fine\".  We did gently review options such as GLP-1 agonist or Mongeon Alda as potential options down the road though he had significant nausea with Trulicity/metformin on board together so he just continues at this time with metformin 500 mg twice daily.  His last A1c was less than 90 days ago in May at 10% so it is too soon for recheck.     - He has worked hard for a 70 pound weight loss in the last year with diet/exercise changes and congratulated him with these efforts and encouragement " "to continue  - He does not want me to adjust any medication today and I encouraged him to continue the conversation with new PCP in a month. Will need microalbumin, lipids, A1c, foot exam etc at that time     Class 3 severe obesity with serious comorbidity and body mass index (BMI) of 45.0 to 49.9 in adult, unspecified obesity type (H)  Risks of obesity were discussed, including co-morbidities such as diabetes, HTN, HLD, CAD and stroke.   - encouraged moderate physical activity of 150 minutes per week  - encouraged healthy dietary choices like eating real foods, increasing protein & vegetables, and watching portion sizes.       BMI:   Estimated body mass index is 40.54 kg/m  as calculated from the following:    Height as of this encounter: 1.854 m (6' 1\").    Weight as of this encounter: 139.4 kg (307 lb 5 oz).   Weight management plan: Discussed healthy diet and exercise guidelines    Return in about 3 months (around 11/24/2022) for Recheck.    Abiola Leggett MD  [facility]   Mohsen is a 42 year old, presenting for the following health issues:  Back Pain (Sciatic nerve pain since beginning of June. Ice 3 x a day, has been doing stretches. Has trouble sleeping due to this. Has recently stopped taking his muscle relaxer, not sure if he should continue this or what other things he should be doing for this pain. Has seen specialist, states it is not spine issue. ) and Derm Problem (Has skin irritation on upper thighs.)    Patient is new to me today and understands my panel is closed for new patients and will need to establish with a new PCP for ongoing care.    He went to the spine clinic a few times, last on 6/29/2022 in the physician assistant there reviewed his MRI imaging which showed no specific etiology for this pain.  He has not had any prior spinal injections.  He describes right low back pain that radiates down the right lateral hip and buttock area and " "anterior thigh with associated numbness and tingling.  He is sensitive to clothing even.  The symptoms improved a little bit with the Medrol Dosepak.  He did require even some hydrocodone, tramadol and Tylenol #3 previously for the severe pain.     He was able to stop most/all these meds in the past 3 days and pains are somewhat improved though he continues on the gabapentin 900mg TID (for nerve sensations on anterior thigh). He does still get the occasional muscle spasm in right leg but hasn't used the muscle relaxor robaxin three times a day anymore. Also taking ibuprofen at bedtime.   Pain is 2/10 currently; last month it was nonstop.    Since July has been icing 3x/day and since last week doing a pigeon pose to stretch and doing this regularly helps a ton      Diabetes: he is taking 2 metformin a day; but can't tolerate 4 a day due to nausea.   \"Trulicity and me do not get along\"- nausea the whole time  He plans to work out again after his sciatica improves  He has lost about 70lbs since last year. Weight stable now at 307lbs for a month.   He does still swim.    Wt Readings from Last 3 Encounters:   08/24/22 139.4 kg (307 lb 5 oz)   06/06/22 144 kg (317 lb 8 oz)   05/31/22 145.3 kg (320 lb 6.4 oz)       Review of Systems   Constitutional, HEENT, cardiovascular, pulmonary, gi and gu systems are negative, except as otherwise noted.      Objective    BP (!) 140/88 (BP Location: Left arm, Patient Position: Sitting, Cuff Size: Adult Large)   Pulse 78   Ht 1.854 m (6' 1\")   Wt 139.4 kg (307 lb 5 oz)   SpO2 96%   BMI 40.54 kg/m    Body mass index is 40.54 kg/m .  Physical Exam              .  ..  "

## 2022-09-16 ENCOUNTER — IMMUNIZATION (OUTPATIENT)
Dept: NURSING | Facility: CLINIC | Age: 42
End: 2022-09-16
Payer: COMMERCIAL

## 2022-09-16 PROCEDURE — 91312 COVID-19,PF,PFIZER BOOSTER BIVALENT: CPT

## 2022-09-16 PROCEDURE — 0124A COVID-19,PF,PFIZER BOOSTER BIVALENT: CPT

## 2022-09-17 ENCOUNTER — HEALTH MAINTENANCE LETTER (OUTPATIENT)
Age: 42
End: 2022-09-17

## 2022-11-16 ENCOUNTER — IMMUNIZATION (OUTPATIENT)
Dept: FAMILY MEDICINE | Facility: CLINIC | Age: 42
End: 2022-11-16
Payer: COMMERCIAL

## 2022-11-16 PROCEDURE — 90686 IIV4 VACC NO PRSV 0.5 ML IM: CPT

## 2022-11-16 PROCEDURE — 90471 IMMUNIZATION ADMIN: CPT

## 2023-01-28 ENCOUNTER — HEALTH MAINTENANCE LETTER (OUTPATIENT)
Age: 43
End: 2023-01-28

## 2023-02-02 ENCOUNTER — HOSPITAL ENCOUNTER (OUTPATIENT)
Dept: ULTRASOUND IMAGING | Facility: CLINIC | Age: 43
Discharge: HOME | End: 2023-02-02
Attending: PHYSICIAN ASSISTANT | Admitting: PHYSICIAN ASSISTANT
Payer: COMMERCIAL

## 2023-02-02 ENCOUNTER — OFFICE VISIT (OUTPATIENT)
Dept: FAMILY MEDICINE | Facility: CLINIC | Age: 43
End: 2023-02-02
Payer: COMMERCIAL

## 2023-02-02 VITALS
OXYGEN SATURATION: 97 % | RESPIRATION RATE: 18 BRPM | TEMPERATURE: 98 F | DIASTOLIC BLOOD PRESSURE: 88 MMHG | SYSTOLIC BLOOD PRESSURE: 150 MMHG | HEART RATE: 100 BPM

## 2023-02-02 DIAGNOSIS — J06.9 UPPER RESPIRATORY TRACT INFECTION, UNSPECIFIED TYPE: ICD-10-CM

## 2023-02-02 DIAGNOSIS — N45.3 EPIDIDYMOORCHITIS: Primary | ICD-10-CM

## 2023-02-02 DIAGNOSIS — N50.89 TESTICULAR SWELLING, RIGHT: ICD-10-CM

## 2023-02-02 LAB
ALBUMIN UR-MCNC: NEGATIVE MG/DL
APPEARANCE UR: CLEAR
BILIRUB UR QL STRIP: NEGATIVE
COLOR UR AUTO: YELLOW
GLUCOSE UR STRIP-MCNC: >=1000 MG/DL
HGB UR QL STRIP: NEGATIVE
KETONES UR STRIP-MCNC: 15 MG/DL
LEUKOCYTE ESTERASE UR QL STRIP: NEGATIVE
NITRATE UR QL: NEGATIVE
PH UR STRIP: 5 [PH] (ref 5–8)
RADIOLOGIST FLAGS: ABNORMAL
SP GR UR STRIP: 1.01 (ref 1–1.03)
UROBILINOGEN UR STRIP-ACNC: 0.2 E.U./DL

## 2023-02-02 PROCEDURE — 99215 OFFICE O/P EST HI 40 MIN: CPT | Performed by: PHYSICIAN ASSISTANT

## 2023-02-02 PROCEDURE — 81003 URINALYSIS AUTO W/O SCOPE: CPT | Performed by: PHYSICIAN ASSISTANT

## 2023-02-02 PROCEDURE — 93976 VASCULAR STUDY: CPT

## 2023-02-02 RX ORDER — CIPROFLOXACIN 500 MG/1
500 TABLET, FILM COATED ORAL 2 TIMES DAILY
Qty: 20 TABLET | Refills: 0 | Status: SHIPPED | OUTPATIENT
Start: 2023-02-02 | End: 2023-02-12

## 2023-02-02 NOTE — PROGRESS NOTES
Patient presents with:  Cough: Cough nasal congestion   Testicular Problem: Swollen  testicle right side       Clinical Decision Making:  Patient had presented with a swollen right painful testicle.  Urinalysis did not show infection but the ultrasound did show that there was epididymal orchitis and hydroceles.  Patient is treated with Cipro and scrotal elevation and support.  Referral to urology.  To Medicare reviewed for his upper respiratory symptoms.  Questions were answered to patient's satisfaction before discharge.    40 min spent on the date of the encounter in chart review, patient visit, review of tests, documentation and/ordiscussion with other providers about the issues documented above.       ICD-10-CM    1. Epididymoorchitis  N45.3 ciprofloxacin (CIPRO) 500 MG tablet     Adult Urology  Referral      2. Testicular swelling, right  N50.89 UA macro with reflex to Microscopic and Culture - Clinc Collect     US Testicular & Scrotum w Doppler Ltd      3. Upper respiratory tract infection, unspecified type  J06.9           Patient Instructions   Scrotal elevation with either a athletic supporter or support of athletic garment such as a swimsuit or biking short.  Take the oral antibiotic as written  Use of ibuprofen 800 mg 3 times a day with food for analgesia and anti-inflammatory effect  Follow-up with urology for evaluation and treatment          HPI:  Mohsen Morales is a 43 year old male who has a past medical history of diabetes mellitus type 2 who presents today for 2 separate complaints.  The first complaint is that he has had runny nose cough and congestion.  This is been over the last couple days.  He is being seen in conjunction with his son who is diagnosed with strep throat.    Second, and most concerning problem is that he has had pain and swelling in the right scrotum.  Over the last 4 days patient has had swelling and pain in the right scrotum and testicle.  He has not had gross  hematuria irritative voiding symptoms and is continuing to urinate without difficulty.  No reported fever chills night sweats fatigue flank or back pain.    History obtained from chart review and the patient.    Problem List:  Class 3 severe obesity with serious comorbidity and body mass index   (BMI) of 45.0 to 49.9 in adult, unspecified obesity type (H)  Obstructive sleep apnea  Vitamin D Deficiency  Allergies  Diabetes mellitus type 2 in obese (H)  Ankle Sprain      History reviewed. No pertinent past medical history.    Social History     Tobacco Use     Smoking status: Never     Smokeless tobacco: Never   Substance Use Topics     Alcohol use: Not on file       Review of Systems  As above in HPI otherwise negative.    Vitals:    02/02/23 1156   BP: (!) 150/88   Pulse: 100   Resp: 18   Temp: 98  F (36.7  C)   TempSrc: Oral   SpO2: 97%       General: Patient is resting comfortably no acute distress is afebrile  HEENT: Head is normocephalic atraumatic   eyes are PERRL EOMI sclera anicteric   TMs are clear bilaterally  Throat is clear  No cervical lymphadenopathy present  LUNGS: Clear to auscultation bilaterally  HEART: Regular rate and rhythm  Skin: Without rash non-diaphoretic  :   Right testicle is markedly swollen it is tender to examination and is mostly tender to palpation over the epididymis on the superior portion of the testicle also the right cord structure is markedly swollen and there are palpable masses.    Physical Exam      Labs:  Results for orders placed or performed in visit on 02/02/23   US Testicular & Scrotum w Doppler Ltd     Status: Abnormal   Result Value Ref Range    Radiologist flags Right epididymo-orchitis (Urgent)     Narrative    EXAM: US TESTICULAR AND SCROTUM WITH DOPPLER LIMITED  LOCATION: [facility]  DATE/TIME: 2/2/2023 1:33 PM    INDICATION: right testicular pain and edema  COMPARISON: 06/14/2022  TECHNIQUE: Ultrasound of scrotum with color flow and  spectral Doppler with waveform analysis performed.    FINDINGS:    RIGHT: Right testicle measures 4 x 2.6 x 2.5 cm. Normal testicle with no masses. Increased arterial duplex and increased color flow. Increased epididymis outflow. Complex small hydrocele. No varicocele.    LEFT: Left testicle measures 3.3 x 2.6 x 2.5 cm. Normal testicle with no masses. Stable 2 mm testicular echogenic focus. No further evaluation of this needed. Normal arterial duplex and normal color flow. Normal epididymis. No hydrocele. No varicocele.      Impression    IMPRESSION:  1.  Increased blood flow right testicle and epididymis consistent with epididymal orchitis.  2.  Small complex right hydrocele.  3.  Normal-appearing left testicle.      [Access Center: Right epididymo-orchitis]    This report will be copied to the [facility] to ensure a provider acknowledges the finding. Access Center is available Monday through Friday 8am-3:30 pm.      UA macro with reflex to Microscopic and Culture - Clinc Collect     Status: Abnormal    Specimen: Urine, Clean Catch   Result Value Ref Range    Color Urine Yellow Colorless, Straw, Light Yellow, Yellow    Appearance Urine Clear Clear    Glucose Urine >=1000 (A) Negative mg/dL    Bilirubin Urine Negative Negative    Ketones Urine 15 (A) Negative mg/dL    Specific Gravity Urine 1.010 1.005 - 1.030    Blood Urine Negative Negative    pH Urine 5.0 5.0 - 8.0    Protein Albumin Urine Negative Negative mg/dL    Urobilinogen Urine 0.2 0.2, 1.0 E.U./dL    Nitrite Urine Negative Negative    Leukocyte Esterase Urine Negative Negative    Narrative    Microscopic not indicated     At the end of the encounter, I discussed results, diagnosis, medications. Discussed red flags for immediate return to clinic/ER, as well as indications for follow up if no improvement. Patient understood and agreed to plan. Patient was stable for discharge.

## 2023-02-02 NOTE — PATIENT INSTRUCTIONS
Scrotal elevation with either a athletic supporter or support of athletic garment such as a swimsuit or biking short.  Take the oral antibiotic as written  Use of ibuprofen 800 mg 3 times a day with food for analgesia and anti-inflammatory effect  Follow-up with urology for evaluation and treatment

## 2023-07-30 ENCOUNTER — HEALTH MAINTENANCE LETTER (OUTPATIENT)
Age: 43
End: 2023-07-30

## 2023-10-08 ENCOUNTER — HEALTH MAINTENANCE LETTER (OUTPATIENT)
Age: 43
End: 2023-10-08

## 2023-10-15 ENCOUNTER — TRANSFERRED RECORDS (OUTPATIENT)
Dept: MULTI SPECIALTY CLINIC | Facility: CLINIC | Age: 43
End: 2023-10-15

## 2023-10-15 LAB — RETINOPATHY: NORMAL

## 2023-12-22 ENCOUNTER — OFFICE VISIT (OUTPATIENT)
Dept: FAMILY MEDICINE | Facility: CLINIC | Age: 43
End: 2023-12-22
Payer: COMMERCIAL

## 2023-12-22 VITALS
BODY MASS INDEX: 41.75 KG/M2 | HEIGHT: 73 IN | WEIGHT: 315 LBS | SYSTOLIC BLOOD PRESSURE: 149 MMHG | TEMPERATURE: 97.8 F | DIASTOLIC BLOOD PRESSURE: 88 MMHG | HEART RATE: 76 BPM | OXYGEN SATURATION: 98 % | RESPIRATION RATE: 16 BRPM

## 2023-12-22 DIAGNOSIS — R03.0 ELEVATED BLOOD PRESSURE READING WITHOUT DIAGNOSIS OF HYPERTENSION: ICD-10-CM

## 2023-12-22 DIAGNOSIS — Z12.11 SCREEN FOR COLON CANCER: ICD-10-CM

## 2023-12-22 DIAGNOSIS — M25.512 ACUTE PAIN OF LEFT SHOULDER: ICD-10-CM

## 2023-12-22 DIAGNOSIS — E11.69 DIABETES MELLITUS TYPE 2 IN OBESE: Primary | ICD-10-CM

## 2023-12-22 DIAGNOSIS — E66.9 DIABETES MELLITUS TYPE 2 IN OBESE: Primary | ICD-10-CM

## 2023-12-22 PROCEDURE — 90471 IMMUNIZATION ADMIN: CPT | Performed by: NURSE PRACTITIONER

## 2023-12-22 PROCEDURE — 99213 OFFICE O/P EST LOW 20 MIN: CPT | Mod: 25 | Performed by: NURSE PRACTITIONER

## 2023-12-22 PROCEDURE — 90480 ADMN SARSCOV2 VAC 1/ONLY CMP: CPT | Performed by: NURSE PRACTITIONER

## 2023-12-22 PROCEDURE — 90686 IIV4 VACC NO PRSV 0.5 ML IM: CPT | Performed by: NURSE PRACTITIONER

## 2023-12-22 PROCEDURE — 91320 SARSCV2 VAC 30MCG TRS-SUC IM: CPT | Performed by: NURSE PRACTITIONER

## 2023-12-22 NOTE — PATIENT INSTRUCTIONS
"  Consider keeping a food diary (i.e. My Fitness Pal, Lose It, or other food tracker). Try to use it 24 hours, every day for at least one week. This will help with accountability.  Start Weighing yourself every day, this will aide in keeping you accountable. If it makes you anxious, you can skip this step.      Nutrition Goals  1) Follow 3194-2702 calorie/day plan; can use [employer] rishi to help with diary.               -www.eatthismuch.com     2) 9\" Plate method (1/2 plate non-starchy vegetables/fruit, 1/4 plate lean protein, 1/4 plate whole grain starch - no more than 1/2 cup carb/meal).     3).Eat 3 meals a day (not 2, not 5) Chew your food well/SLOW down    4) Eat your protein first, this will aide in filling you up. Eat Wheat, not white (bread, pasta, crackers, madhavi, bagels, tortillas, rice)  ---Can aim for 80 grams of lean protein daily should be effective for you and allow you to have good control over appetite if you start getting a protein rich meal with 25-30 grams of protein every 4.5-6 hours through the day. This protein anchor to the meal should be bulked up with some good vegetables/greens for the fiber/crunch/chew and benefit on your blood pressure.  Get the protein portion of your meal at the beginning of the meal as your appetite, especially if you are on appetite suppressant/diabetes medication. Appetite suppressants can cause fullness and we want to make sure you get at least 25 grams of protein at each of your 3 meals daily to support good metabolism and lean muscle mass.      5). Be a water drinker/Minize liquid calories (no regular pop, no juice) skim or 1% milk OK  6) Sleep 7-8 hours each night. Address sleep if problematic  7) Stress management is important. Address if problematic  8) Limit restaurant, cafeteria, take out, drive through to 2 times per week or less  9) Minimize caffeine, alcohol, and night-time snacking    -Follow up with the dietitian    **Some lean proteins: chicken, " turkey, tuna, salmon, crab, fish, shrimp, scallops, lobster, lean cuts of beef and pork, luncheon meats, veggie burgers, beans (black, lima, garbanzo, maldonado, kidney, refried), chile, cottage cheese, string cheese, other cheese, eggs, tofu, peanut butter, nuts, vegan crumbles, greek yogurt      --- Continue physical activity; can start with 10 minutes per day like going for a walk after dinner or at your lunchtime. Goal is 8000 steps daily. For the Muscle: maintain your muscle mass (strength training 2X/wk)    Meal prep tips: https://www.AIT.Barcheyacht/healthyeats/healthy-tips/2019/10/meal-prep-tips-kelly-experts    Avoid snacking as able. If snack is needed use lean protein and/or fruit/vegetable. Examples:              - 2 cup popcorn              - 1 cup mixed berries              - 15 almonds, walnuts, cashews              - carrot/celery sticks and 2 tbsp low-fat ranch              - 1 hard boiled egg              - Part-skim mozzarella cheese stick              - Low-fat, low-sugar greek yogurt with 1/2 cup berries              - Med apple or pear              - sliced bell peppers with 1/2 cup salsa              - 1/2 cup roasted chickpeas              - sliced cucumbers with vinegar     100 calorie sweets: Smart Sweets, Fiber One desserts, Fit and Active 100 calorie snack sweets at [employer]; [employer] 100 calorie pre-portioned cookies, sugar-free pudding, sugar-free jello.     Snack Recipes:  - Banana and creamy PB dip (https://www.diabetesfoodhub.org/recipes/sweet-peanut-buttery-dip.html?home-category_id=23)  - Roasted chickpeas (https://www.diabetesfoodhub.org/recipes/roasted-and-spiced-chickpeas.html?home-category_id=23)  - Lemon Raspberry bibi seed pudding (https://www.diabetesfoodhub.org/recipes/lemon-raspberry- bibi-seed-pudding.html?home-category_id=23)  - Black bean hummus with carrot and celery sticks (https://www.diabetesfoodhub.org/recipes/black-bean-hummus.html?home-category_id=23)  - Greek yogurt  "chocolate mouse (https://www.diabetesfoodhub.org/recipes/greek-yogurt-chocolate-mousse.html?home-category_id=23)   - Broccoli Cheese Bites  (https://www.diabetesfoodhub.org/recipes/broccoli-cheese-bites.html?home-category_id=20)  - Chicken Satay with peanut sauce  (https://www.diabetesfoodhub.org/recipes/blueberry-almond-chicken-salad-lettuce-wraps.html?home-category_id=20)  - Deviled Eggs  (https://www.diabetesfoodhub.org/recipes/devilled-eggs.html?home-category_id=20)     Healthy Recipe Resources:  Books:  \"The Volumetrics Eating Plan\" by Andra Torres, Ph.D.  \"Cooking that Counts\" by editors of [employer]  \"Calorie Smart Meals\" cookbook by [employer] (200-500 calorie meals)     Websites:  www.Crunched  www.Octoplus  https://www.diabetesfoodhub.org/all-recipes.html  https://www.Biscoot.Tweetminster/  Https://www.Ionic Securitymyplate.gov/myplatekitchen  https://snaped.fns.usda.gov/recipes-menus   https://www.Tujia/gallery/4983181/dietitian-budget-high-protein-dinner-recipes/  https://www.Ecovision.Tweetminster/recipes/84/healthy-recipes/         Cultural Cuisines:  https://www.Respectance.Tweetminster/recipes/26960/cuisines-regions//  https://www.firstnations.org/knowledge-center/recipes/  https://JDLab.Tweetminster/recipe-index/     Apps:  MealVoluntis rishi (or website, mealime.com)   Lore         The Plate Method  Http://www.Librato/061278.pdf     Protein Sources   http://Librato/896078.pdf      Carbohydrates  http://fvfiles.com/461777.pdf      Mindful Eating  http://Librato/423022.pdf      Summary of Volumetrics Eating Plan  http://fvfiles.com/474995.pdf     "

## 2023-12-22 NOTE — PROGRESS NOTES
"  Assessment & Plan     Diabetes mellitus type 2 in obese (H)  *  - Adult Eye  Referral    Screen for colon cancer  *  - Colonoscopy Screening  Referral    Acute pain of left shoulder  *    - leaning towards tendon inflammation since pain has been improving. I offered physical therapy and he declined. Stretching and conservative measures discussed. He can see Ortho if on-going.   - DM briefly discussed and my concern for potential uncontrolled DM, and patient declined interventions or labs today. He stated he will come back. Benefits discussed.             BMI:   Estimated body mass index is 42.35 kg/m  as calculated from the following:    Height as of this encounter: 1.854 m (6' 1\").    Weight as of this encounter: 145.6 kg (321 lb).   Weight management plan: Discussed healthy diet and exercise guidelines        JOSE Maki CNP  [facility]   Mohsen is a 43 year old, presenting for the following health issues:  Musculoskeletal Problem (Left arm pain in shoulder, injury, started 3x months) and Establish Care      12/22/2023    11:21 AM   Additional Questions   Roomed by Ashwin     -He has noticed left shoulder pain for about 3 months.  He stated that he was playing soccer, parents against kids game, and fell.   -He stated that he is having pain when he raises his arm above his head but able to raise it. He denied any weakness, numbness or tingling. Can hurt to lie on it.   -He stopped his medications for his diabetes.  He does not want to further discuss this today, but plans on changing his diet, and will follow-up for an exam next year.     History of Present Illness       Reason for visit:  Left arm and shoulder pain  Symptom onset:  More than a month  Symptoms include:  Pain when moving left arm at certain angles  Symptom intensity:  Moderate  Symptom progression:  Worsening  Had these symptoms before:  No                  Review of Systems " "        Objective    BP (!) 149/88   Pulse 76   Temp 97.8  F (36.6  C) (Oral)   Resp 16   Ht 1.854 m (6' 1\")   Wt 145.6 kg (321 lb)   SpO2 98%   BMI 42.35 kg/m    Body mass index is 42.35 kg/m .  Physical Exam  Vitals and nursing note reviewed.   Constitutional:       Appearance: Normal appearance.   Pulmonary:      Effort: Pulmonary effort is normal.   Musculoskeletal:      Right shoulder: Normal.      Left shoulder: Normal. Normal strength.      Cervical back: Normal range of motion. No rigidity or tenderness.      Comments: Negative empty can test. Mild decrease ROM with raising his left arm above his head.    Lymphadenopathy:      Cervical: No cervical adenopathy.   Skin:     General: Skin is warm and dry.      Capillary Refill: Capillary refill takes less than 2 seconds.   Neurological:      General: No focal deficit present.      Mental Status: He is alert. Mental status is at baseline. He is disoriented.   Psychiatric:         Mood and Affect: Mood normal.         Behavior: Behavior normal.         Thought Content: Thought content normal.         Judgment: Judgment normal.                              "

## 2024-02-25 ENCOUNTER — HEALTH MAINTENANCE LETTER (OUTPATIENT)
Age: 44
End: 2024-02-25

## 2024-02-27 ENCOUNTER — OFFICE VISIT (OUTPATIENT)
Dept: FAMILY MEDICINE | Facility: CLINIC | Age: 44
End: 2024-02-27
Payer: COMMERCIAL

## 2024-02-27 VITALS
HEART RATE: 76 BPM | BODY MASS INDEX: 41.96 KG/M2 | DIASTOLIC BLOOD PRESSURE: 80 MMHG | SYSTOLIC BLOOD PRESSURE: 148 MMHG | RESPIRATION RATE: 16 BRPM | OXYGEN SATURATION: 96 % | WEIGHT: 315 LBS | TEMPERATURE: 98.2 F

## 2024-02-27 DIAGNOSIS — L72.3 SEBACEOUS CYST: Primary | ICD-10-CM

## 2024-02-27 DIAGNOSIS — E11.69 DIABETES MELLITUS TYPE 2 IN OBESE: ICD-10-CM

## 2024-02-27 DIAGNOSIS — E66.9 DIABETES MELLITUS TYPE 2 IN OBESE: ICD-10-CM

## 2024-02-27 PROCEDURE — 99214 OFFICE O/P EST MOD 30 MIN: CPT | Performed by: PHYSICIAN ASSISTANT

## 2024-02-27 RX ORDER — CLINDAMYCIN HCL 300 MG
300 CAPSULE ORAL 3 TIMES DAILY
Qty: 21 CAPSULE | Refills: 0 | Status: SHIPPED | OUTPATIENT
Start: 2024-02-27 | End: 2024-03-05

## 2024-02-27 ASSESSMENT — PAIN SCALES - GENERAL: PAINLEVEL: MODERATE PAIN (4)

## 2024-02-27 NOTE — PROGRESS NOTES
Patient presents with:  Mass: Painful bump behind right ear x 1 week , tender to touch  progressively getting bigger and worsen radiates to head causing headache       Clinical Decision Making:  Carbuncle on right posterior occiput/neck.  Area was outlined with marking pen.  Will use hot packs and clindamycin, based on the patient's antibiotic allergies.  Referral to general surgery for definitive evaluation and treatment based on the size of the carbuncle and the location and the fact that he is a diabetic.  Indication for urgent return was gone over between today's appointment and follow-up with general surgery.  Questions were answered to patient's satisfaction before discharge.         ICD-10-CM    1. Sebaceous cyst  L72.3 Adult General Surg Referral     clindamycin (CLEOCIN) 300 MG capsule      2. Diabetes mellitus type 2 in obese (H)  E11.69     E66.9           Patient Instructions   Hot packs to the area 3 times per day 20 minutes on each hour.  Do not fall asleep with the hot packs on the skin.  Take the antibiotic as written  Symptoms should not be getting worse over the next 24 hours after taking the antibiotic.  Should have start of improvement after 48 hours on the antibiotic  Use of probiotics to help prevent diarrhea.  Separate dosing of the antibiotic from the probiotic by 2 hours or they are not in the stomach at the same time.  Use of over-the-counter yogurt for helping with stomach upset and diarrhea.  Return to clinic if not getting good resolution or if new symptoms or concerns arise.        HPI:  Mohsen Morales is a 44 year old male who has diabetes mellitus type 2 who presents today for evaluation of 1 week worsening history of painful mass on the right posterior occiput and right lateral neck.  It is tender to touch and is progressively getting bigger but is not draining.  He does not have constitutional symptoms to have fever chills night sweats or fatigue.    History obtained from chart  review and the patient.    Problem List:  Class 3 severe obesity with serious comorbidity and body mass index   (BMI) of 45.0 to 49.9 in adult, unspecified obesity type (H)  Obstructive sleep apnea  Vitamin D Deficiency  Allergies  Diabetes mellitus type 2 in obese (H)  Ankle Sprain      No past medical history on file.    Social History     Tobacco Use    Smoking status: Never     Passive exposure: Never    Smokeless tobacco: Never   Substance Use Topics    Alcohol use: Not on file       Review of Systems  As above in HPI otherwise negative.    Vitals:    02/27/24 1235   BP: (!) 148/80   Pulse: 76   Resp: 16   Temp: 98.2  F (36.8  C)   SpO2: 96%   Weight: 144.2 kg (318 lb)       General: Patient is resting comfortably no acute distress is afebrile  HEENT: Head is normocephalic atraumatic   eyes are PERRL EOMI sclera anicteric   No cervical lymphadenopathy present  Skin: With 5 cm diameter indurated large carbuncle as noted in medical image below    Physical Exam              At the end of the encounter, I discussed results, diagnosis, medications. Discussed red flags for immediate return to clinic/ER, as well as indications for follow up if no improvement. Patient understood and agreed to plan. Patient was stable for discharge.

## 2024-03-01 ENCOUNTER — OFFICE VISIT (OUTPATIENT)
Dept: FAMILY MEDICINE | Facility: CLINIC | Age: 44
End: 2024-03-01
Payer: COMMERCIAL

## 2024-03-01 ENCOUNTER — OFFICE VISIT (OUTPATIENT)
Dept: SURGERY | Facility: CLINIC | Age: 44
End: 2024-03-01
Attending: NURSE PRACTITIONER
Payer: COMMERCIAL

## 2024-03-01 VITALS
DIASTOLIC BLOOD PRESSURE: 93 MMHG | TEMPERATURE: 97.7 F | RESPIRATION RATE: 14 BRPM | HEART RATE: 77 BPM | BODY MASS INDEX: 41.75 KG/M2 | WEIGHT: 315 LBS | HEIGHT: 73 IN | SYSTOLIC BLOOD PRESSURE: 151 MMHG | OXYGEN SATURATION: 98 %

## 2024-03-01 VITALS
SYSTOLIC BLOOD PRESSURE: 154 MMHG | BODY MASS INDEX: 41.75 KG/M2 | DIASTOLIC BLOOD PRESSURE: 90 MMHG | WEIGHT: 315 LBS | HEIGHT: 73 IN

## 2024-03-01 DIAGNOSIS — E11.65 UNCONTROLLED DIABETES MELLITUS WITH HYPERGLYCEMIA, WITHOUT LONG-TERM CURRENT USE OF INSULIN (H): ICD-10-CM

## 2024-03-01 DIAGNOSIS — L02.11 ABSCESS OF NECK: Primary | ICD-10-CM

## 2024-03-01 DIAGNOSIS — E66.813 CLASS 3 SEVERE OBESITY DUE TO EXCESS CALORIES WITH SERIOUS COMORBIDITY AND BODY MASS INDEX (BMI) OF 40.0 TO 44.9 IN ADULT (H): ICD-10-CM

## 2024-03-01 DIAGNOSIS — R03.0 ELEVATED BLOOD PRESSURE READING WITHOUT DIAGNOSIS OF HYPERTENSION: ICD-10-CM

## 2024-03-01 DIAGNOSIS — E08.65 DIABETES MELLITUS DUE TO UNDERLYING CONDITION WITH HYPERGLYCEMIA, WITHOUT LONG-TERM CURRENT USE OF INSULIN (H): ICD-10-CM

## 2024-03-01 DIAGNOSIS — E66.01 CLASS 3 SEVERE OBESITY DUE TO EXCESS CALORIES WITH SERIOUS COMORBIDITY AND BODY MASS INDEX (BMI) OF 40.0 TO 44.9 IN ADULT (H): ICD-10-CM

## 2024-03-01 DIAGNOSIS — Z13.29 SCREENING FOR THYROID DISORDER: ICD-10-CM

## 2024-03-01 PROBLEM — E66.9 DIABETES MELLITUS TYPE 2 IN OBESE: Status: ACTIVE | Noted: 2024-03-01

## 2024-03-01 PROBLEM — E55.9 VITAMIN D DEFICIENCY: Status: ACTIVE | Noted: 2024-03-01

## 2024-03-01 PROBLEM — S93.409A ANKLE SPRAIN: Status: ACTIVE | Noted: 2024-03-01

## 2024-03-01 PROBLEM — M79.604 PAIN IN RIGHT LEG: Status: ACTIVE | Noted: 2021-11-12

## 2024-03-01 PROBLEM — E11.69 DIABETES MELLITUS TYPE 2 IN OBESE: Status: ACTIVE | Noted: 2024-03-01

## 2024-03-01 PROBLEM — Z91.09 OTHER ALLERGY, OTHER THAN TO MEDICINAL AGENTS: Status: ACTIVE | Noted: 2024-03-01

## 2024-03-01 LAB
BASOPHILS # BLD AUTO: 0.1 10E3/UL (ref 0–0.2)
BASOPHILS NFR BLD AUTO: 1 %
EOSINOPHIL # BLD AUTO: 0.2 10E3/UL (ref 0–0.7)
EOSINOPHIL NFR BLD AUTO: 2 %
ERYTHROCYTE [DISTWIDTH] IN BLOOD BY AUTOMATED COUNT: 13.8 % (ref 10–15)
HBA1C MFR BLD: 10.8 % (ref 0–5.6)
HCT VFR BLD AUTO: 44.5 % (ref 40–53)
HGB BLD-MCNC: 15.4 G/DL (ref 13.3–17.7)
IMM GRANULOCYTES # BLD: 0.1 10E3/UL
IMM GRANULOCYTES NFR BLD: 1 %
LYMPHOCYTES # BLD AUTO: 1.8 10E3/UL (ref 0.8–5.3)
LYMPHOCYTES NFR BLD AUTO: 18 %
MCH RBC QN AUTO: 26.9 PG (ref 26.5–33)
MCHC RBC AUTO-ENTMCNC: 34.6 G/DL (ref 31.5–36.5)
MCV RBC AUTO: 78 FL (ref 78–100)
MONOCYTES # BLD AUTO: 0.6 10E3/UL (ref 0–1.3)
MONOCYTES NFR BLD AUTO: 6 %
NEUTROPHILS # BLD AUTO: 7.3 10E3/UL (ref 1.6–8.3)
NEUTROPHILS NFR BLD AUTO: 73 %
PLATELET # BLD AUTO: 181 10E3/UL (ref 150–450)
RBC # BLD AUTO: 5.72 10E6/UL (ref 4.4–5.9)
WBC # BLD AUTO: 10.1 10E3/UL (ref 4–11)

## 2024-03-01 PROCEDURE — 80061 LIPID PANEL: CPT | Performed by: NURSE PRACTITIONER

## 2024-03-01 PROCEDURE — 10060 I&D ABSCESS SIMPLE/SINGLE: CPT | Performed by: SPECIALIST

## 2024-03-01 PROCEDURE — 84443 ASSAY THYROID STIM HORMONE: CPT | Performed by: NURSE PRACTITIONER

## 2024-03-01 PROCEDURE — 82570 ASSAY OF URINE CREATININE: CPT | Performed by: NURSE PRACTITIONER

## 2024-03-01 PROCEDURE — 99214 OFFICE O/P EST MOD 30 MIN: CPT | Performed by: NURSE PRACTITIONER

## 2024-03-01 PROCEDURE — 36415 COLL VENOUS BLD VENIPUNCTURE: CPT | Performed by: NURSE PRACTITIONER

## 2024-03-01 PROCEDURE — 82043 UR ALBUMIN QUANTITATIVE: CPT | Performed by: NURSE PRACTITIONER

## 2024-03-01 PROCEDURE — 80053 COMPREHEN METABOLIC PANEL: CPT | Performed by: NURSE PRACTITIONER

## 2024-03-01 PROCEDURE — 85025 COMPLETE CBC W/AUTO DIFF WBC: CPT | Performed by: NURSE PRACTITIONER

## 2024-03-01 PROCEDURE — 86140 C-REACTIVE PROTEIN: CPT | Performed by: NURSE PRACTITIONER

## 2024-03-01 PROCEDURE — 83036 HEMOGLOBIN GLYCOSYLATED A1C: CPT | Performed by: NURSE PRACTITIONER

## 2024-03-01 PROCEDURE — 82248 BILIRUBIN DIRECT: CPT | Performed by: NURSE PRACTITIONER

## 2024-03-01 RX ORDER — METFORMIN HCL 500 MG
1000 TABLET, EXTENDED RELEASE 24 HR ORAL 2 TIMES DAILY WITH MEALS
Qty: 360 TABLET | Refills: 3 | Status: SHIPPED | OUTPATIENT
Start: 2024-03-01

## 2024-03-01 NOTE — PROGRESS NOTES
HST pick-up, HST drop-off, and follow-up appointment with provider have all been scheduled. Gave patient HST hand-out at clinic visit.   Yamilex Watts, CMA     RN was asked by provider to call the Surgical referral to try and get patient in for same day appt.    RN called and patient scheduled for 1:30 pm at [facility].      MIGUELITO Cat  [facility]  [phone]    [facility]   Monday  - Thursday 7 AM - 6 PM    Friday  7 AM - 5 PM     -Please call your clinic for assistance from a nurse after hours.

## 2024-03-01 NOTE — PROGRESS NOTES
Assessment & Plan     Diabetes mellitus due to underlying condition with hyperglycemia, without long-term current use of insulin (H)  *  - Lipid panel reflex to direct LDL Non-fasting  - Albumin Random Urine Quantitative with Creat Ratio  - HEMOGLOBIN A1C  - BASIC METABOLIC PANEL  - Hepatic panel (Albumin, ALT, AST, Bili, Alk Phos, TP)  - Lipid panel reflex to direct LDL Non-fasting  - Albumin Random Urine Quantitative with Creat Ratio  - HEMOGLOBIN A1C  - BASIC METABOLIC PANEL  - Hepatic panel (Albumin, ALT, AST, Bili, Alk Phos, TP)  - Adult Diabetes Education  Referral  - metFORMIN (GLUCOPHAGE XR) 500 MG 24 hr tablet  Dispense: 360 tablet; Refill: 3    Abscess of neck  *  - Adult General Surg Referral  - CBC with platelets and differential  - CBC with platelets and differential  - CRP, inflammation    Class 3 severe obesity due to excess calories with serious comorbidity and body mass index (BMI) of 40.0 to 44.9 in adult (H)  *    Uncontrolled diabetes mellitus with hyperglycemia, without long-term current use of insulin (H)  *  - metFORMIN (GLUCOPHAGE XR) 500 MG 24 hr tablet  Dispense: 360 tablet; Refill: 3    Elevated blood pressure reading without diagnosis of hypertension  *    Screening for thyroid disorder  *  - TSH    -Was seen in urgent care and placed on clindamycin.  Since being on the clindamycin, he stated that the area is mildly less red and tender.  He is also not noticing more enlargement or fevers.  Has uncontrolled diabetes and has been off medications for couple years.  At visit today he declined any type of injectable medication.  I discussed that A1c is elevated, not under control, and both metformin and insulin are suggested.  He declined insulin, and is willing to go on oral metformin again, change his lifestyle habits, and monitor readings.  He does have a meter at home just not using it.  He is willing to restart using it and has test trips at [facility]. He is willing to follow-up with  diabetes educator.  He stated that his weight has decreased since originally diagnosed with diabetes type 2 in the past.  Metformin ordered.  May add on another medication if labs are appropriate such as Jardiance.   -Has tried Trulicity in the past and had side effects.   -Abscess noted to right posterior neck area.  Please see media section.  It has not improved, but has been stable according to his assessment.  With the combination of uncontrolled diabetes, abscess size, surgery consult initiated for emergent incisional drain.  He is aware to follow-up with him today.   -CBC is stable.   -Blood pressure elevated today.  Has been elevated in the past.  Once labs are back and BMP is normal, I will order him Olmesartan and have him follow-up with nurse in 2 weeks.   Ways to lower blood pressure were discussed today such as low-salt diet and weight loss.   -Will treat cholesterol per guidelines.  Lipids ordered.  Patient is just drinking Pedialyte for now.   Thyroid level will be checked and will be treated according guidelines.                   Tom Connolly is a 44 year old, presenting for the following health issues:  Diabetes (Follow UP) and Mass (Bump on the back of the neck, found 2x weeks)      3/1/2024     9:26 AM   Additional Questions   Roomed by Ashwin     Via the Health Maintenance questionnaire, the patient has reported the following services have been completed -Eye Exam, this information has been sent to the abstraction team.  History of Present Illness       Reason for visit:  Shoulder, neck and arm pain    He eats 2-3 servings of fruits and vegetables daily.He consumes 2 sweetened beverage(s) daily.He exercises with enough effort to increase his heart rate 9 or less minutes per day.  He exercises with enough effort to increase his heart rate 3 or less days per week.   He is taking medications regularly.                     Objective    BP (!) 151/93   Pulse 77   Temp 97.7  F (36.5  C) (Oral)    "Resp 14   Ht 1.854 m (6' 1\")   Wt 145.2 kg (320 lb)   SpO2 98%   BMI 42.22 kg/m    Body mass index is 42.22 kg/m .  Physical Exam  Vitals and nursing note reviewed.   Constitutional:       Appearance: Normal appearance. He is obese.   Cardiovascular:      Rate and Rhythm: Normal rate.   Pulmonary:      Effort: Pulmonary effort is normal.   Musculoskeletal:      Cervical back: Normal range of motion. No rigidity.      Right lower leg: No edema.      Left lower leg: No edema.   Lymphadenopathy:      Cervical: No cervical adenopathy.   Skin:     General: Skin is warm.      Capillary Refill: Capillary refill takes less than 2 seconds.      Coloration: Skin is not cyanotic, mottled or pale.      Findings: Abscess and erythema present. No abrasion, acne, bruising, burn, ecchymosis, signs of injury, laceration, lesion, petechiae, rash or wound. Rash is not crusting, macular, nodular, papular, purpuric, pustular, scaling, urticarial or vesicular.             Comments: Golf ball size red, blanchable, semi firm, mild tender lump. Indurated beyond golf ball size, about baseball size induration. Mild warmth also noted, no drainage or red streaks noted.    Neurological:      General: No focal deficit present.      Mental Status: He is alert and oriented to person, place, and time.   Psychiatric:         Mood and Affect: Mood normal.         Behavior: Behavior normal.         Thought Content: Thought content normal.         Judgment: Judgment normal.         Results for orders placed or performed in visit on 03/01/24   HEMOGLOBIN A1C     Status: Abnormal   Result Value Ref Range    Hemoglobin A1C 10.8 (H) 0.0 - 5.6 %   CBC with platelets and differential     Status: None   Result Value Ref Range    WBC Count 10.1 4.0 - 11.0 10e3/uL    RBC Count 5.72 4.40 - 5.90 10e6/uL    Hemoglobin 15.4 13.3 - 17.7 g/dL    Hematocrit 44.5 40.0 - 53.0 %    MCV 78 78 - 100 fL    MCH 26.9 26.5 - 33.0 pg    MCHC 34.6 31.5 - 36.5 g/dL    RDW 13.8 " 10.0 - 15.0 %    Platelet Count 181 150 - 450 10e3/uL    % Neutrophils 73 %    % Lymphocytes 18 %    % Monocytes 6 %    % Eosinophils 2 %    % Basophils 1 %    % Immature Granulocytes 1 %    Absolute Neutrophils 7.3 1.6 - 8.3 10e3/uL    Absolute Lymphocytes 1.8 0.8 - 5.3 10e3/uL    Absolute Monocytes 0.6 0.0 - 1.3 10e3/uL    Absolute Eosinophils 0.2 0.0 - 0.7 10e3/uL    Absolute Basophils 0.1 0.0 - 0.2 10e3/uL    Absolute Immature Granulocytes 0.1 <=0.4 10e3/uL   CBC with platelets and differential     Status: None    Narrative    The following orders were created for panel order CBC with platelets and differential.  Procedure                               Abnormality         Status                     ---------                               -----------         ------                     CBC with platelets and d...[[id number]]                      Final result                 Please view results for these tests on the individual orders.           Signed Electronically by: JOSE Maki CNP

## 2024-03-01 NOTE — LETTER
3/1/2024         RE: Mohsen Morales  [address] [place] [address]        Dear Colleague,    Thank you for referring your patient, Mohsen Morales, to the [facility]. Please see a copy of my visit note below.    See procedure note          Deven Vázquez MD  General Surgery [phone]  Vascular Surgery [phone]        Again, thank you for allowing me to participate in the care of your patient.        Sincerely,        Deven Vázquez MD

## 2024-03-02 LAB
ALBUMIN SERPL BCG-MCNC: 4.6 G/DL (ref 3.5–5.2)
ALP SERPL-CCNC: 75 U/L (ref 40–150)
ALT SERPL W P-5'-P-CCNC: 24 U/L (ref 0–70)
ANION GAP SERPL CALCULATED.3IONS-SCNC: 11 MMOL/L (ref 7–15)
AST SERPL W P-5'-P-CCNC: 22 U/L (ref 0–45)
BILIRUB DIRECT SERPL-MCNC: 0.23 MG/DL (ref 0–0.3)
BILIRUB SERPL-MCNC: 0.9 MG/DL
BUN SERPL-MCNC: 16.4 MG/DL (ref 6–20)
CALCIUM SERPL-MCNC: 9.7 MG/DL (ref 8.6–10)
CHLORIDE SERPL-SCNC: 101 MMOL/L (ref 98–107)
CHOLEST SERPL-MCNC: 171 MG/DL
CREAT SERPL-MCNC: 0.54 MG/DL (ref 0.67–1.17)
CREAT UR-MCNC: 60 MG/DL
CRP SERPL-MCNC: 96.5 MG/L
DEPRECATED HCO3 PLAS-SCNC: 24 MMOL/L (ref 22–29)
EGFRCR SERPLBLD CKD-EPI 2021: >90 ML/MIN/1.73M2
FASTING STATUS PATIENT QL REPORTED: NORMAL
GLUCOSE SERPL-MCNC: 279 MG/DL (ref 70–99)
HDLC SERPL-MCNC: 56 MG/DL
LDLC SERPL CALC-MCNC: 96 MG/DL
MICROALBUMIN UR-MCNC: 15.4 MG/L
MICROALBUMIN/CREAT UR: 25.67 MG/G CR (ref 0–17)
NONHDLC SERPL-MCNC: 115 MG/DL
POTASSIUM SERPL-SCNC: 5 MMOL/L (ref 3.4–5.3)
PROT SERPL-MCNC: 7.2 G/DL (ref 6.4–8.3)
SODIUM SERPL-SCNC: 136 MMOL/L (ref 135–145)
TRIGL SERPL-MCNC: 95 MG/DL
TSH SERPL DL<=0.005 MIU/L-ACNC: 2.41 UIU/ML (ref 0.3–4.2)

## 2024-03-07 NOTE — PROCEDURES
[unfilled]    SKIN: Incision and drainage neck    Date/Time: 3/1/2024 2:59 PM    Performed by: Deven Vázquez MD  Authorized by: Deven Vázquez MD      UNIVERSAL PROTOCOL   Site Marked: Yes  Prior Images Obtained and Reviewed:  Yes  Required items: Required blood products, implants, devices and special equipment available    Patient identity confirmed:  Verbally with patient  NA - No sedation, light sedation, or local anesthesia  Confirmation Checklist:  Patient's identity using two indicators  Time out: Immediately prior to the procedure a time out was called    Universal Protocol: the Joint Commission Universal Protocol was followed    Preparation: Patient was prepped and draped in usual sterile fashion    ESBL (mL):  8    ANESTHESIA  local infiltration  Local anesthesia used: yes  Local Anesthetic: lidocaine 1% with epinephrine  Anesthetic total: 15 mL      SEDATION    Patient Sedated: No      Type: abscess  Body area: neck  Location details: left posterior neck  Risk factor: underlying major vessel, underlying major nerve and coagulopathy  Scalpel size: 15  Needle gauge: 22  Incision type: single straight  Incision depth: subcutaneous  Complexity: simple  Drainage: purulent  Drainage amount: moderate  Wound treatment: wound left open  Packing material: 1/2 in gauze      PROCEDURE  Describe Procedure: Consent was obtained patient    Sterile manner.  Confirmed all identifiers confirmed the site with the patient.  Local anesthetic infused in the area after prepped with sterile Betadine.  Incision was made over the mass or cyst which was infected with this abscess.  We got into this abscess pretty easily and found unfortunately foul-smelling brown pus which was extracted and irrigated out.  We used 20 cc of irrigation removed probably 20 cc of pus.  Cleaned this out and then packed this with a gauze.  Sterile dressings applied instructed upon cares.      Patient Tolerance:  Patient tolerated the procedure  well with no immediate complications  Length of time physician/provider present for 1:1 monitoring during sedation: 0

## 2024-03-07 NOTE — PROGRESS NOTES
See procedure note          Deevn Vázquez MD  General Surgery [phone]  Vascular Surgery [phone]

## 2024-03-11 ENCOUNTER — OFFICE VISIT (OUTPATIENT)
Dept: SURGERY | Facility: CLINIC | Age: 44
End: 2024-03-11
Payer: COMMERCIAL

## 2024-03-11 VITALS — SYSTOLIC BLOOD PRESSURE: 138 MMHG | DIASTOLIC BLOOD PRESSURE: 82 MMHG

## 2024-03-11 DIAGNOSIS — L02.11 ABSCESS OF NECK: Primary | ICD-10-CM

## 2024-03-11 PROCEDURE — 99024 POSTOP FOLLOW-UP VISIT: CPT | Performed by: SPECIALIST

## 2024-03-11 NOTE — LETTER
3/11/2024         RE: Mohsen Morales  [address] [place] [address]        Dear Colleague,    Thank you for referring your patient, Mohsen Morales, to the [facility]ARE [place]. Please see a copy of my visit note below.    [facility] Surgery Follow up    HPI:    44 year old year old male who returns for a follow up. Irrigation and debridement of back neck cyst which was infected and abscess. Improving and minimal drainage.     Allergies:Amoxicillin, Glipizide, Jardiance [empagliflozin], and Penicillins    History reviewed. No pertinent past medical history.    Past Surgical History:   Procedure Laterality Date     ABSCESS DRAINAGE  3/1/2024       CURRENT MEDS:  Current Outpatient Medications   Medication     acetaminophen (TYLENOL) 500 MG tablet     alcohol swab prep pads     blood glucose (NO BRAND SPECIFIED) test strip     blood glucose monitoring (NO BRAND SPECIFIED) meter device kit     blood glucose test (ONETOUCH ULTRA TEST) strips     fexofenadine (ALLEGRA) 180 MG tablet     lancets (ONETOUCH DELICA LANCETS) 30 gauge Misc     metFORMIN (GLUCOPHAGE XR) 500 MG 24 hr tablet     multivitamin therapeutic tablet     Naproxen Sodium (ALEVE PO)     omeprazole (PRILOSEC) 20 MG capsule     thin (NO BRAND SPECIFIED) lancets     UNABLE TO FIND     No current facility-administered medications for this visit.       Family History   Problem Relation Age of Onset     Hypertension Mother      Diabetes Paternal Uncle         reports that he has never smoked. He has never been exposed to tobacco smoke. He has never used smokeless tobacco.    Review of Systems:  Negative except infection  drained    OBJECTIVE:  Vitals:    03/11/24 0946   BP: 138/82     There is no height or weight on file to calculate BMI.    Status: doing well    EXAM:  GENERAL: This is a well-developed 44 year old male who appears his stated age  HEAD: normocephalic  HEENT: Pupils equal and reactive  bilaterally  CARDIAC: RRR without murmur  CHEST/LUNG:  Clear to auscultation  ABDOMEN: Soft, nontender, nondistended, no masses    NEUROLOGIC: Focally intact, nonfocal  VASCULAR: Pulses intact, symmetrical upper and lower extremities.     ABSCESS: closed except small line. No drainage today3    LABS:  Lab Results   Component Value Date    WBC 10.1 03/01/2024    HGB 15.4 03/01/2024    HCT 44.5 03/01/2024    MCV 78 03/01/2024     03/01/2024     Lab Results   Component Value Date    ALT 24 03/01/2024    AST 22 03/01/2024    ALKPHOS 75 03/01/2024          Assessment/Plan:   Drained abscess/cyst    Discussed should close I doubt it will recur if it does he should return and we could resect if it isn't infected.   Discussed and answered questions.   He is comfortable with this plan.         Deven Vázquez MD  General Surgery [phone]  Vascular Surgery [phone]            No follow-ups on file.     Deven Vázquez MD ,MD  [facility] Department of Surgery      Again, thank you for allowing me to participate in the care of your patient.        Sincerely,        Deven Vázquez MD

## 2024-03-12 NOTE — RESULT ENCOUNTER NOTE
Robert Connolly    I hope your abscess is improving. I noted that you saw the surgeon again yesterday.     Your A1c is not under control for age. Ideal range is 7% or below. Please continue the Metformin and see the DM nurse. She will be able to possibly start a continuous sensor for you and another oral medication. Please ask about an oral GLP-1 medication. This med will aide the best with both dm control and weight loss.     Any other questions, please let me know.     Mis

## 2024-03-22 NOTE — PROGRESS NOTES
[facility] Surgery Follow up    HPI:    44 year old year old male who returns for a follow up. Irrigation and debridement of back neck cyst which was infected and abscess. Improving and minimal drainage.     Allergies:Amoxicillin, Glipizide, Jardiance [empagliflozin], and Penicillins    History reviewed. No pertinent past medical history.    Past Surgical History:   Procedure Laterality Date    ABSCESS DRAINAGE  3/1/2024       CURRENT MEDS:  Current Outpatient Medications   Medication    acetaminophen (TYLENOL) 500 MG tablet    alcohol swab prep pads    blood glucose (NO BRAND SPECIFIED) test strip    blood glucose monitoring (NO BRAND SPECIFIED) meter device kit    blood glucose test (ONETOUCH ULTRA TEST) strips    fexofenadine (ALLEGRA) 180 MG tablet    lancets (ONETOUCH DELICA LANCETS) 30 gauge Misc    metFORMIN (GLUCOPHAGE XR) 500 MG 24 hr tablet    multivitamin therapeutic tablet    Naproxen Sodium (ALEVE PO)    omeprazole (PRILOSEC) 20 MG capsule    thin (NO BRAND SPECIFIED) lancets    UNABLE TO FIND     No current facility-administered medications for this visit.       Family History   Problem Relation Age of Onset    Hypertension Mother     Diabetes Paternal Uncle         reports that he has never smoked. He has never been exposed to tobacco smoke. He has never used smokeless tobacco.    Review of Systems:  Negative except infection  drained    OBJECTIVE:  Vitals:    03/11/24 0946   BP: 138/82     There is no height or weight on file to calculate BMI.    Status: doing well    EXAM:  GENERAL: This is a well-developed 44 year old male who appears his stated age  HEAD: normocephalic  HEENT: Pupils equal and reactive bilaterally  CARDIAC: RRR without murmur  CHEST/LUNG:  Clear to auscultation  ABDOMEN: Soft, nontender, nondistended, no masses    NEUROLOGIC: Focally intact, nonfocal  VASCULAR: Pulses intact, symmetrical upper and lower extremities.     ABSCESS: closed except small line. No drainage  today3    LABS:  Lab Results   Component Value Date    WBC 10.1 03/01/2024    HGB 15.4 03/01/2024    HCT 44.5 03/01/2024    MCV 78 03/01/2024     03/01/2024     Lab Results   Component Value Date    ALT 24 03/01/2024    AST 22 03/01/2024    ALKPHOS 75 03/01/2024          Assessment/Plan:   Drained abscess/cyst    Discussed should close I doubt it will recur if it does he should return and we could resect if it isn't infected.   Discussed and answered questions.   He is comfortable with this plan.         Deven Vázquez MD  General Surgery [phone]  Vascular Surgery [phone]            No follow-ups on file.     Deven Vázquez MD ,MD  [facility] Department of Surgery

## 2024-08-13 ENCOUNTER — TELEPHONE (OUTPATIENT)
Dept: FAMILY MEDICINE | Facility: CLINIC | Age: 44
End: 2024-08-13
Payer: COMMERCIAL

## 2024-08-13 NOTE — TELEPHONE ENCOUNTER
Patient is scheduled for an office visit with Cara on 09/16/2024 at 10:40 AM to check A1C for patient quality of care.    Ashwin HONG

## 2024-09-21 ENCOUNTER — HEALTH MAINTENANCE LETTER (OUTPATIENT)
Age: 44
End: 2024-09-21

## 2024-11-05 ENCOUNTER — ALLIED HEALTH/NURSE VISIT (OUTPATIENT)
Dept: FAMILY MEDICINE | Facility: CLINIC | Age: 44
End: 2024-11-05
Payer: COMMERCIAL

## 2024-11-05 VITALS — TEMPERATURE: 98.1 F

## 2024-11-05 DIAGNOSIS — Z23 ENCOUNTER FOR IMMUNIZATION: Primary | ICD-10-CM

## 2024-11-05 PROCEDURE — 90471 IMMUNIZATION ADMIN: CPT

## 2024-11-05 PROCEDURE — 90656 IIV3 VACC NO PRSV 0.5 ML IM: CPT

## 2024-11-05 PROCEDURE — 90480 ADMN SARSCOV2 VAC 1/ONLY CMP: CPT

## 2024-11-05 PROCEDURE — 99207 PR NO CHARGE NURSE ONLY: CPT

## 2024-11-05 PROCEDURE — 91320 SARSCV2 VAC 30MCG TRS-SUC IM: CPT

## 2024-11-05 NOTE — PROGRESS NOTES
Prior to immunization administration, verified patients identity using patient s name and date of birth. Please see Immunization Activity for additional information.     Screening Questionnaire for Adult Immunization    Are you sick today?   No   Do you have allergies to medications, food, a vaccine component or latex?   No   Have you ever had a serious reaction after receiving a vaccination?   No   Do you have a long-term health problem with heart, lung, kidney, or metabolic disease (e.g., diabetes), asthma, a blood disorder, no spleen, complement component deficiency, a cochlear implant, or a spinal fluid leak?  Are you on long-term aspirin therapy?   No   Do you have cancer, leukemia, HIV/AIDS, or any other immune system problem?   No   Do you have a parent, brother, or sister with an immune system problem?   No   In the past 3 months, have you taken medications that affect  your immune system, such as prednisone, other steroids, or anticancer drugs; drugs for the treatment of rheumatoid arthritis, Crohn s disease, or psoriasis; or have you had radiation treatments?   No   Have you had a seizure, or a brain or other nervous system problem?   No   During the past year, have you received a transfusion of blood or blood    products, or been given immune (gamma) globulin or antiviral drug?   No   For women: Are you pregnant or is there a chance you could become       pregnant during the next month?   No   Have you received any vaccinations in the past 4 weeks?   No     Immunization questionnaire answers were all negative.    I have reviewed the following standing orders:   This patient is due and qualifies for the Covid-19 vaccine.     Click here for COVID-19 Standing Order    I have reviewed the vaccines inclusion and exclusion criteria; No concerns regarding eligibility.     This patient is due and qualifies for the Influenza vaccine.    Click here for Influenza Vaccine Standing Order    I have reviewed the  vaccines inclusion and exclusion criteria; No concerns regarding eligibility.     Patient instructed to remain in clinic for 15 minutes afterwards, and to report any adverse reactions.     Screening performed by Dianelys Subramanian MA on 11/5/2024 at 10:08 AM.

## 2024-11-30 ENCOUNTER — HEALTH MAINTENANCE LETTER (OUTPATIENT)
Age: 44
End: 2024-11-30

## 2025-04-05 ENCOUNTER — HEALTH MAINTENANCE LETTER (OUTPATIENT)
Age: 45
End: 2025-04-05

## 2025-04-18 ENCOUNTER — OFFICE VISIT (OUTPATIENT)
Dept: FAMILY MEDICINE | Facility: CLINIC | Age: 45
End: 2025-04-18
Payer: COMMERCIAL

## 2025-04-18 VITALS
DIASTOLIC BLOOD PRESSURE: 96 MMHG | SYSTOLIC BLOOD PRESSURE: 173 MMHG | RESPIRATION RATE: 20 BRPM | HEIGHT: 73 IN | HEART RATE: 89 BPM | WEIGHT: 315 LBS | TEMPERATURE: 97.8 F | OXYGEN SATURATION: 98 % | BODY MASS INDEX: 41.75 KG/M2

## 2025-04-18 DIAGNOSIS — L02.212 ABSCESS OF BACK: ICD-10-CM

## 2025-04-18 DIAGNOSIS — E08.65 DIABETES MELLITUS DUE TO UNDERLYING CONDITION WITH HYPERGLYCEMIA, WITHOUT LONG-TERM CURRENT USE OF INSULIN (H): ICD-10-CM

## 2025-04-18 DIAGNOSIS — Z12.11 SCREEN FOR COLON CANCER: Primary | ICD-10-CM

## 2025-04-18 DIAGNOSIS — E11.65 UNCONTROLLED DIABETES MELLITUS WITH HYPERGLYCEMIA, WITHOUT LONG-TERM CURRENT USE OF INSULIN (H): ICD-10-CM

## 2025-04-18 DIAGNOSIS — G47.33 OBSTRUCTIVE SLEEP APNEA SYNDROME: ICD-10-CM

## 2025-04-18 DIAGNOSIS — E13.44: ICD-10-CM

## 2025-04-18 DIAGNOSIS — F40.298 NEEDLE PHOBIA: ICD-10-CM

## 2025-04-18 LAB
ALT SERPL W P-5'-P-CCNC: 18 U/L (ref 0–70)
ANION GAP SERPL CALCULATED.3IONS-SCNC: 12 MMOL/L (ref 7–15)
AST SERPL W P-5'-P-CCNC: 15 U/L (ref 0–45)
BUN SERPL-MCNC: 14.7 MG/DL (ref 6–20)
CALCIUM SERPL-MCNC: 9.6 MG/DL (ref 8.8–10.4)
CHLORIDE SERPL-SCNC: 100 MMOL/L (ref 98–107)
CHOLEST SERPL-MCNC: 145 MG/DL
CREAT SERPL-MCNC: 0.61 MG/DL (ref 0.67–1.17)
CREAT UR-MCNC: 30.3 MG/DL
EGFRCR SERPLBLD CKD-EPI 2021: >90 ML/MIN/1.73M2
EST. AVERAGE GLUCOSE BLD GHB EST-MCNC: 289 MG/DL
FASTING STATUS PATIENT QL REPORTED: ABNORMAL
FASTING STATUS PATIENT QL REPORTED: NORMAL
GLUCOSE SERPL-MCNC: 411 MG/DL (ref 70–99)
HBA1C MFR BLD: 11.7 % (ref 0–5.6)
HCO3 SERPL-SCNC: 23 MMOL/L (ref 22–29)
HDLC SERPL-MCNC: 55 MG/DL
LDLC SERPL CALC-MCNC: 72 MG/DL
MICROALBUMIN UR-MCNC: <12 MG/L
MICROALBUMIN/CREAT UR: NORMAL MG/G{CREAT}
NONHDLC SERPL-MCNC: 90 MG/DL
POTASSIUM SERPL-SCNC: 4.5 MMOL/L (ref 3.4–5.3)
SODIUM SERPL-SCNC: 135 MMOL/L (ref 135–145)
TRIGL SERPL-MCNC: 88 MG/DL

## 2025-04-18 PROCEDURE — 83036 HEMOGLOBIN GLYCOSYLATED A1C: CPT | Performed by: NURSE PRACTITIONER

## 2025-04-18 PROCEDURE — 87077 CULTURE AEROBIC IDENTIFY: CPT | Performed by: NURSE PRACTITIONER

## 2025-04-18 PROCEDURE — 36415 COLL VENOUS BLD VENIPUNCTURE: CPT | Performed by: NURSE PRACTITIONER

## 2025-04-18 PROCEDURE — 82043 UR ALBUMIN QUANTITATIVE: CPT | Performed by: NURSE PRACTITIONER

## 2025-04-18 PROCEDURE — 87070 CULTURE OTHR SPECIMN AEROBIC: CPT | Performed by: NURSE PRACTITIONER

## 2025-04-18 PROCEDURE — 80061 LIPID PANEL: CPT | Performed by: NURSE PRACTITIONER

## 2025-04-18 PROCEDURE — G2211 COMPLEX E/M VISIT ADD ON: HCPCS | Performed by: NURSE PRACTITIONER

## 2025-04-18 PROCEDURE — 87205 SMEAR GRAM STAIN: CPT | Performed by: NURSE PRACTITIONER

## 2025-04-18 PROCEDURE — 3080F DIAST BP >= 90 MM HG: CPT | Performed by: NURSE PRACTITIONER

## 2025-04-18 PROCEDURE — 82570 ASSAY OF URINE CREATININE: CPT | Performed by: NURSE PRACTITIONER

## 2025-04-18 PROCEDURE — 99214 OFFICE O/P EST MOD 30 MIN: CPT | Performed by: NURSE PRACTITIONER

## 2025-04-18 PROCEDURE — 3077F SYST BP >= 140 MM HG: CPT | Performed by: NURSE PRACTITIONER

## 2025-04-18 PROCEDURE — 84450 TRANSFERASE (AST) (SGOT): CPT | Performed by: NURSE PRACTITIONER

## 2025-04-18 PROCEDURE — 84460 ALANINE AMINO (ALT) (SGPT): CPT | Performed by: NURSE PRACTITIONER

## 2025-04-18 PROCEDURE — 87186 SC STD MICRODIL/AGAR DIL: CPT | Performed by: NURSE PRACTITIONER

## 2025-04-18 PROCEDURE — 80048 BASIC METABOLIC PNL TOTAL CA: CPT | Performed by: NURSE PRACTITIONER

## 2025-04-18 RX ORDER — METFORMIN HYDROCHLORIDE 500 MG/1
1000 TABLET, EXTENDED RELEASE ORAL 2 TIMES DAILY WITH MEALS
Qty: 360 TABLET | Refills: 3 | Status: SHIPPED | OUTPATIENT
Start: 2025-04-18

## 2025-04-18 RX ORDER — CLINDAMYCIN HYDROCHLORIDE 300 MG/1
300 CAPSULE ORAL 3 TIMES DAILY
Qty: 9 CAPSULE | Refills: 0 | Status: SHIPPED | OUTPATIENT
Start: 2025-04-18 | End: 2025-04-21

## 2025-04-18 NOTE — PROGRESS NOTES
Assessment & Plan     Screen for colon cancer  **  - Colonoscopy Screening  Referral    Diabetes mellitus due to underlying condition with hyperglycemia, without long-term current use of insulin (H)  **  - AST  - ALT  - metFORMIN (GLUCOPHAGE XR) 500 MG 24 hr tablet  Dispense: 360 tablet; Refill: 3  - Lipid panel reflex to direct LDL Non-fasting  - Albumin Random Urine Quantitative with Creat Ratio  - AST  - ALT    Uncontrolled diabetes mellitus with hyperglycemia, without long-term current use of insulin (H)  **  - HEMOGLOBIN A1C  - Adult Eye  Referral  - BASIC METABOLIC PANEL  - Lipid panel reflex to direct LDL Non-fasting  - Albumin Random Urine Quantitative with Creat Ratio  - Adult Gen Surg  Referral  - AST  - ALT  - metFORMIN (GLUCOPHAGE XR) 500 MG 24 hr tablet  Dispense: 360 tablet; Refill: 3  - Med Therapy Management Referral  - HEMOGLOBIN A1C  - BASIC METABOLIC PANEL  - Lipid panel reflex to direct LDL Non-fasting  - Albumin Random Urine Quantitative with Creat Ratio  - AST  - ALT    Abscess of back  **  - Adult Gen Surg  Referral  - Abscess Aerobic Bacterial Culture Routine With Gram Stain    Needle phobia  **declined all injectable medications.     Obstructive sleep apnea syndrome  **    Encounter Diagnoses   Name Primary?    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 Due to the added complexity in care, I will continue to support Mohsen in the subsequent  "management and with ongoing continuity of care.      - abscess noted on back. It was draining, culture obtained. Warning signs discussed today. Med ordered which was helpful last year. Side effects discussed with this med, which includes Colitis/diarrhea. Stop med if diarrhea occurs.   - I spent time with patient and discussed my concern about his uncontrolled DM. This can be a possible trigger to his abscess and will delay healing and add on complications. He stopped all medications from prior visits. I discussed importance of these medications and possible side effects of medications and long term complications of uncontrolled DM. He is aware of diet and is willing to restart only Metformin, other options were discussed such an oral GLP1's, etc. He is willing to see the MTM and further discuss other options. Declined CGM.   - follow up with labs and OV in three months.   - ARY stable   - cholesterol stable, statin discussed today and he declined. Benefits discussed.   - BP elevated, checked multiple times at visit. He feels related to the situation. Offered med, and he declined. ARB is preferred related to his DM, benefits discussed. Will follow up with MTM and recheck.     BMI  Estimated body mass index is 42.19 kg/m  as calculated from the following:    Height as of this encounter: 1.854 m (6' 1\").    Weight as of this encounter: 145.1 kg (319 lb 12.8 oz).   Weight management plan: Discussed healthy diet and exercise guidelines      Follow-up       Tom Connolly is a 45 year old, presenting for the following health issues:  Derm Problem (A red spot on my back noticed on Tuesday 04/15/25, causing significant pain- started draining this morning significant amount came out (blood knight mixture) because of pain in back, ribs and spine down the middle also hurts.)    - He stopped his medications for diabetes because he read about possible kidney damage while on metformin.  He did not tolerate Trulicity.  He " also does not tolerate and has a lot of anxiety needles.  He stated that he has also tried he believes Jardiance in the past and was sick.  Metformin is typically tolerated.  He does not check blood sugars at home.   - He has noticed a reoccurrence red/abscess to right side of back.  He denied any fever or chills.  It started draining today, and brownish mucus drainage is being excised when he applies pressure.  His wife has been helping him alleviate the drainage as well.  His symptoms have improved since being able to alleviate the drainage, and he feels that the abscess has gotten smaller.  He has been taking ibuprofen intermittently for the pain.         4/18/2025     2:14 PM   Additional Questions   Roomed by Maryellen   Accompanied by Self     History of Present Illness       Back Pain:  He presents for follow up of back pain. Patient's back pain is a new problem.    Original cause of back pain: not sure  First noticed back pain: in the last week  Patient feels back pain: constantlyLocation of back pain:  Right upper back and other  Description of back pain: burning, sharp, shooting and stabbing  Back pain spreads: right shoulder    Since patient first noticed back pain, pain is: gradually worsening  Does back pain interfere with his job:  No  On a scale of 1-10 (10 being the worst), patient describes pain as:  7  What makes back pain worse: coughing, certain positions, lying down and sitting   Acupuncture: not tried  Acetaminophen: not helpful  Activity or exercise: not helpful  Chiropractor:  Not tried  Cold: helpful  Heat: not helpful  Massage: not tried  Muscle relaxants: not tried  NSAIDS: helpful  Opioids: not tried  Physical Therapy: not tried  Rest: not helpful  Steroid Injection: not tried  Stretching: not helpful  Surgery: not tried  TENS unit: not tried  Topical pain relievers: helpful  Other healthcare providers patient is seeing for back pain: Other   He is taking medications regularly.     "                  Objective    BP (!) 173/96 (BP Location: Left arm, Patient Position: Sitting, Cuff Size: Adult Regular)   Pulse 89   Temp 97.8  F (36.6  C) (Oral)   Resp 20   Ht 1.854 m (6' 1\")   Wt (!) 145.1 kg (319 lb 12.8 oz)   SpO2 98%   BMI 42.19 kg/m    Body mass index is 42.19 kg/m .  Physical Exam   GENERAL: alert and no distress  EYES: Eyes grossly normal to inspection, PERRL and conjunctivae and sclerae normal  MS: no gross musculoskeletal defects noted, no edema  SKIN: no suspicious lesions or rashes  NEURO: Normal strength and tone, mentation intact and speech normal  PSYCH: mentation appears normal, affect normal/bright    Results for orders placed or performed in visit on 04/18/25   HEMOGLOBIN A1C     Status: Abnormal   Result Value Ref Range    Estimated Average Glucose 289 (H) <117 mg/dL    Hemoglobin A1C 11.7 (H) 0.0 - 5.6 %    Narrative    Results confirmed by repeat test. MV   BASIC METABOLIC PANEL     Status: Abnormal   Result Value Ref Range    Sodium 135 135 - 145 mmol/L    Potassium 4.5 3.4 - 5.3 mmol/L    Chloride 100 98 - 107 mmol/L    Carbon Dioxide (CO2) 23 22 - 29 mmol/L    Anion Gap 12 7 - 15 mmol/L    Urea Nitrogen 14.7 6.0 - 20.0 mg/dL    Creatinine 0.61 (L) 0.67 - 1.17 mg/dL    GFR Estimate >90 >60 mL/min/1.73m2    Calcium 9.6 8.8 - 10.4 mg/dL    Glucose 411 (H) 70 - 99 mg/dL    Patient Fasting > 8hrs? Unknown    Lipid panel reflex to direct LDL Non-fasting     Status: None   Result Value Ref Range    Cholesterol 145 <200 mg/dL    Triglycerides 88 <150 mg/dL    Direct Measure HDL 55 >=40 mg/dL    LDL Cholesterol Calculated 72 <100 mg/dL    Non HDL Cholesterol 90 <130 mg/dL    Patient Fasting > 8hrs? Unknown     Narrative    Cholesterol  Desirable: < 200 mg/dL  Borderline High: 200 - 239 mg/dL  High: >= 240 mg/dL    Triglycerides  Normal: < 150 mg/dL  Borderline High: 150 - 199 mg/dL  High: 200-499 mg/dL  Very High: >= 500 mg/dL    Direct Measure HDL  Female: >= 50 mg/dL "   Male: >= 40 mg/dL    LDL Cholesterol  Desirable: < 100 mg/dL  Above Desirable: 100 - 129 mg/dL   Borderline High: 130 - 159 mg/dL   High:  160 - 189 mg/dL   Very High: >= 190 mg/dL    Non HDL Cholesterol  Desirable: < 130 mg/dL  Above Desirable: 130 - 159 mg/dL  Borderline High: 160 - 189 mg/dL  High: 190 - 219 mg/dL  Very High: >= 220 mg/dL   Albumin Random Urine Quantitative with Creat Ratio     Status: None   Result Value Ref Range    Creatinine Urine mg/dL 30.3 mg/dL    Albumin Urine mg/L <12.0 mg/L    Albumin Urine mg/g Cr     AST     Status: Normal   Result Value Ref Range    AST 15 0 - 45 U/L   ALT     Status: Normal   Result Value Ref Range    ALT 18 0 - 70 U/L   Abscess Aerobic Bacterial Culture Routine With Gram Stain     Status: Abnormal    Specimen: Back, Upper; Abscess   Result Value Ref Range    Culture 4+ Staphylococcus aureus (A)     Gram Stain Result 2+ Gram positive cocci (A)     Gram Stain Result No white blood cells seen (A)        Susceptibility    Staphylococcus aureus - YASMIN     Oxacillin* 0.5 Susceptible ug/mL      * Oxacillin susceptible isolates are susceptible to cephalosporins (example: cefazolin and cephalexin) and beta lactam combination agents. Oxacillin resistant isolates are resistant to these agents.     Gentamicin <=0.5 Susceptible ug/mL     Erythromycin <=0.25 Susceptible ug/mL     Clindamycin 0.25 Susceptible ug/mL     Vancomycin 1 Susceptible ug/mL     Daptomycin 1 Susceptible ug/mL     Tetracycline <=1 Susceptible ug/mL     Doxycycline <=0.5 Susceptible ug/mL     Trimethoprim/Sulfamethoxazole <=0.5/9.5 Susceptible ug/mL     No results found for this or any previous visit (from the past 24 hours).        Signed Electronically by: JOSE Maki CNP

## 2025-04-21 ENCOUNTER — TELEPHONE (OUTPATIENT)
Dept: FAMILY MEDICINE | Facility: CLINIC | Age: 45
End: 2025-04-21
Payer: COMMERCIAL

## 2025-04-21 DIAGNOSIS — L72.3 SEBACEOUS CYST: ICD-10-CM

## 2025-04-21 LAB
BACTERIA ABSC ANAEROBE+AEROBE CULT: ABNORMAL
GRAM STAIN RESULT: ABNORMAL
GRAM STAIN RESULT: ABNORMAL

## 2025-04-21 RX ORDER — CLINDAMYCIN HYDROCHLORIDE 300 MG/1
300 CAPSULE ORAL 3 TIMES DAILY
Qty: 12 CAPSULE | Refills: 0 | Status: SHIPPED | OUTPATIENT
Start: 2025-04-21

## 2025-04-21 NOTE — TELEPHONE ENCOUNTER
RN huddled with covering provider who approved of order.    Sent to pharmacy.    MIGUELITO Cat  [facility]  [phone]    [facility]   Monday  - Thursday 7 AM - 6 PM    Friday  7 AM - 5 PM     -Please call your clinic for assistance from a nurse after hours.

## 2025-04-21 NOTE — TELEPHONE ENCOUNTER
LOV:04/18/2025  Patient responding to PCP lab result notice on Staph infection from sebaceous cyst.

## 2025-04-21 NOTE — TELEPHONE ENCOUNTER
"  General Call    Contacts       Contact Date/Time Type Contact Phone/Fax    04/21/2025 10:00 AM CDT Phone (Incoming) Mohsen Morales (Self) [phone] (M)          Reason for Call:  reply to last message regarding results    What are your questions or concerns:  pt needs antibiotic renewed (is out of meds today) through Thursday (to equal the 7 day regimen prior to consult),\"it is helping and has been draining consistently\". Pt also has a surgery consult for Thursday 4/24. Pt would like to be notified when the prescription is sent to the pharmacy.    Date of last appointment with provider: 4/18    Could we send this information to you in [employer] or would you prefer to receive a phone call?:   Patient would prefer a phone call   Okay to leave a detailed message?: Yes at Cell number on file:    Telephone Information:   Mobile [phone]     "

## 2025-04-21 NOTE — TELEPHONE ENCOUNTER
"General Call    Contacts       Contact Date/Time Type Contact Phone/Fax    04/21/2025 10:00 AM CDT Phone (Incoming) Mohsen Morales (Self) [phone] (M)          Reason for Call:  MED REFILL NEEDED    What are your questions or concerns:  Patient had 3 days worth of medication started on Friday, 04/18/2025 - prescription states take for 7 days - patient was only given enough for 3 days intentionally as antibiotic was dependent on results for culture which was resulted on 04/18/2025, PCP stated \"Your culture is noting Staph infection...\".   Patient is requesting the full prescription of Clindamycin 300 MG, Sig - Route: Take 1 capsule (300 mg) by mouth 3 times daily for 7 days. - 9 capsules dispensed. Patient will be out of medication today, 04/21/2025. He states there is \"drainage and it seems to be improving\" of redness/abscess to right side of back. Please advise.    Date of last appointment with provider: 04/18/2025    Could we send this information to you in [employer] or would you prefer to receive a phone call?:   Patient would prefer a phone call   Okay to leave a detailed message?: Yes at Home number on file [phone] (home)  "

## 2025-04-21 NOTE — TELEPHONE ENCOUNTER
04/21/25  Pt called to check status of this request. Relayed to pt that we will have a covering provider address.  Marianela

## 2025-04-21 NOTE — RESULT ENCOUNTER NOTE
Robert Connolly    How are you doing? Your culture is noting Staph infection, which is the most common with abscesses. Diabetes can affect your risk for developing and healing from these type of infections. Please continue to see the Surgery team.     Your cholesterol level overall looks good.     Your A1c is high. Please continue to consider starting an addition medication besides Metformin. The MTM can discussed other options.     Mis Abebe

## 2025-04-23 ENCOUNTER — PATIENT OUTREACH (OUTPATIENT)
Dept: CARE COORDINATION | Facility: CLINIC | Age: 45
End: 2025-04-23
Payer: COMMERCIAL

## 2025-04-24 ENCOUNTER — OFFICE VISIT (OUTPATIENT)
Dept: SURGERY | Facility: CLINIC | Age: 45
End: 2025-04-24
Payer: COMMERCIAL

## 2025-04-24 VITALS — SYSTOLIC BLOOD PRESSURE: 142 MMHG | DIASTOLIC BLOOD PRESSURE: 70 MMHG | WEIGHT: 315 LBS | BODY MASS INDEX: 42.09 KG/M2

## 2025-04-24 DIAGNOSIS — L72.3 INFECTED SEBACEOUS CYST: Primary | ICD-10-CM

## 2025-04-24 DIAGNOSIS — L08.9 INFECTED SEBACEOUS CYST: Primary | ICD-10-CM

## 2025-04-24 NOTE — LETTER
4/24/2025      Mohsen Morales  [address] [place] [address]      Dear Colleague,    Thank you for referring your patient, Mohsen Morales, to the [facility] [place]. Please see a copy of my visit note below.    History:   Mohsen Morales is a 45 year old male referred to general surgery for an infected back cyst.  He was seen by Dr. Vázquez last year when he had an infected neck cyst.  He underwent an incision and drainage in March 2024.  He states it was the most painful thing he had ever experienced.  Since that time, he has now developed a painful lesion on his central right back.  It started about 1.5 weeks ago.  He developed pain and swelling.  He was seen by his PCP and he was prescribed clindamycin.  As soon as he started taking the clindamycin the cyst ruptured on its own.  It has been leaking now for the last week.  It is feeling significantly better.  It is not causing any further discomfort.  Denies any systemic symptoms.    Medications:      clindamycin (CLEOCIN) 300 MG capsule, Take 1 capsule (300 mg) by mouth 3 times daily., Disp: 12 capsule, Rfl: 0     fexofenadine (ALLEGRA) 180 MG tablet, Take 180 mg by mouth daily., Disp: , Rfl:      metFORMIN (GLUCOPHAGE XR) 500 MG 24 hr tablet, Take 2 tablets (1,000 mg) by mouth 2 times daily (with meals). Start medication slow and titrate dose up over 3-4 weeks., Disp: 360 tablet, Rfl: 3     acetaminophen (TYLENOL) 500 MG tablet, Take 1,000 mg by mouth every 6 hours as needed for mild pain (Patient not taking: Reported on 4/24/2025), Disp: , Rfl:      alcohol swab prep pads, Use to swab area of injection/enedina as directed. (Patient not taking: Reported on 4/24/2025), Disp: 100 each, Rfl: 3     blood glucose (NO BRAND SPECIFIED) test strip, Use to test blood sugar 3 times daily or as directed. To accompany: Blood Glucose Monitor Brands: per insurance. (Patient not taking: Reported on 4/24/2025), Disp: 100 strip,  Rfl: 6     blood glucose monitoring (NO BRAND SPECIFIED) meter device kit, Use to test blood sugar 3 times daily or as directed. Preferred blood glucose meter OR supplies to accompany: Blood Glucose Monitor Brands: per insurance. (Patient not taking: Reported on 4/24/2025), Disp: 1 kit, Rfl: 0     blood glucose test (ONETOUCH ULTRA TEST) strips, [BLOOD GLUCOSE TEST (ONETOUCH ULTRA TEST) STRIPS] Use 1 each As Directed 6 (six) times a day. (Patient not taking: Reported on 4/24/2025), Disp: 600 each, Rfl: 3     lancets (ONETOUCH DELICA LANCETS) 30 gauge Misc, [LANCETS (ONETOUCH DELICA LANCETS) 30 GAUGE MISC] Inject 1 each under the skin 4 (four) times a day. (Patient not taking: Reported on 4/24/2025), Disp: 300 each, Rfl: 3     multivitamin therapeutic tablet, Take 1 tablet by mouth daily (Patient not taking: Reported on 4/24/2025), Disp: , Rfl:      omeprazole (PRILOSEC) 20 MG capsule, [OMEPRAZOLE (PRILOSEC) 20 MG CAPSULE] Take 1 capsule (20 mg total) by mouth daily before breakfast. (Patient not taking: Reported on 4/24/2025), Disp: 90 capsule, Rfl: 0     thin (NO BRAND SPECIFIED) lancets, Use with lanceting device. To accompany: Blood Glucose Monitor Brands: per insurance. (Patient not taking: Reported on 4/24/2025), Disp: 200 each, Rfl: 1     UNABLE TO FIND, MEDICATION NAME: Super Complex B (Patient not taking: Reported on 4/24/2025), Disp: , Rfl:     Exam:  BP (!) 142/70 (BP Location: Right arm)   Wt (!) 144.7 kg (319 lb)   BMI 42.09 kg/m    Body mass index is 42.09 kg/m .  General : Alert, cooperative, appears stated age   Skin: 8 x 9 cm area of induration and discolored red skin on the central thorax to the right of midline.  The epidermis has sloughed over this.  Findings are consistent with healing from an acutely infected sebaceous cyst    Assessment/Plan:   Mohsen Morales is a 45 year old male who is recovering from an infected sebaceous cyst.  He should complete his antibiotics.  He does not need incision  and drainage since his body drained the cyst on its own.  In 2 to 3 months if he is interested in having the underlying cyst removed he can return to the general surgery clinic.  At that time we would determine whether or not it would be okay to remove in the office under local versus the operating room under MAC anesthesia.    22 minutes was spent in chart prep, discussion, and documentation.    Gabriela Diaz DO  General Surgeon  [facility]  Surgery [address] [address]  Office: [phone]  Employed by - [facility]        Again, thank you for allowing me to participate in the care of your patient.        Sincerely,        Gabriela Diaz DO    Electronically signed

## 2025-06-04 ENCOUNTER — TELEPHONE (OUTPATIENT)
Dept: GASTROENTEROLOGY | Facility: CLINIC | Age: 45
End: 2025-06-04
Payer: COMMERCIAL

## 2025-06-04 NOTE — TELEPHONE ENCOUNTER
"Patient would like to be seen at [facility] due to their location in [place], referral faxed        Endoscopy Scheduling Screen    Caller: patient    Have you had any respiratory illness or flu-like symptoms in the last 10 days?  No    What is your communication preference for Instructions and/or Bowel Prep?   MyChart    What insurance is in the chart?  Other:  [employer]    Ordering/Referring Provider:  Abiola Marroquin APRN CNP   (If ordering provider performs procedure, schedule with ordering provider unless otherwise instructed. )    BMI: Estimated body mass index is 42.09 kg/m  as calculated from the following:    Height as of 4/18/25: 1.854 m (6' 1\").    Weight as of 4/24/25: 144.7 kg (319 lb).     Sedation Ordered  moderate sedation.   If patient BMI > 50 do not schedule in ASC.    If patient BMI > 45 do not schedule at [facility].    Are you taking methadone or Suboxone?  NO, No RN review required.    Have you been diagnosed and are being treated for severe PTSD or severe anxiety?  NO, No RN review required.    Are you taking any prescription medications for pain 3 or more times per week?   NO, No RN review required.    Do you have a history of malignant hyperthermia?  No    (Females) Are you currently pregnant?   No     Have you been diagnosed or told you have pulmonary hypertension?   No    Do you have an LVAD?  No    Have you been told you have moderate to severe sleep apnea?  Yes. Do you use a CPAP? Yes Where is the patient located?. (RN Review required for scheduling unless scheduling in Hospital.)     Have you been told you have COPD, asthma, or any other lung disease?  No    Has your doctor ordered any cardiac tests like echo, angiogram, stress test, ablation, or EKG, that you have not completed yet?  No    Do you  have a history of any heart conditions?  No     Have you ever had or are you waiting for an organ transplant?  No. Continue scheduling, no site restrictions.    Have you had a stroke or transient " "ischemic attack (TIA aka \"mini stroke\") in the last 2 years?   No.    Have you been diagnosed with or been told you have cirrhosis of the liver?   No.    Are you currently on dialysis?   No    Do you need assistance transferring?   No    BMI: Estimated body mass index is 42.09 kg/m  as calculated from the following:    Height as of 4/18/25: 1.854 m (6' 1\").    Weight as of 4/24/25: 144.7 kg (319 lb).     Is patients BMI > 40 and scheduling location [facility]?  No    Do you take an injectable or oral medication for weight loss or diabetes (excluding insulin)?  No    Do you take the medication Naltrexone?  No    Do you take blood thinners?  No       Prep   Are you currently on dialysis or do you have chronic kidney disease?  No    Do you have a diagnosis of diabetes?  Yes (Golytely Prep)    Do you have a diagnosis of cystic fibrosis (CF)?  No    On a regular basis do you go 3 -5 days between bowel movements?  No    BMI > 40?  Yes (Extended Prep)    Preferred Pharmacy:    [facility] [address] [address] [facility] [address]  Phone: [phone] Fax: [phone]    "

## 2025-07-22 ENCOUNTER — OFFICE VISIT (OUTPATIENT)
Dept: FAMILY MEDICINE | Facility: CLINIC | Age: 45
End: 2025-07-22
Payer: COMMERCIAL

## 2025-07-22 VITALS
BODY MASS INDEX: 41.75 KG/M2 | RESPIRATION RATE: 14 BRPM | HEART RATE: 78 BPM | DIASTOLIC BLOOD PRESSURE: 77 MMHG | WEIGHT: 315 LBS | HEIGHT: 73 IN | SYSTOLIC BLOOD PRESSURE: 159 MMHG | OXYGEN SATURATION: 98 %

## 2025-07-22 DIAGNOSIS — E13.44: ICD-10-CM

## 2025-07-22 DIAGNOSIS — S91.109A NON-HEALING OPEN WOUND OF TOE, INITIAL ENCOUNTER: Primary | ICD-10-CM

## 2025-07-22 DIAGNOSIS — Z12.11 SCREEN FOR COLON CANCER: ICD-10-CM

## 2025-07-22 DIAGNOSIS — E11.65 UNCONTROLLED DIABETES MELLITUS WITH HYPERGLYCEMIA, WITHOUT LONG-TERM CURRENT USE OF INSULIN (H): ICD-10-CM
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
## 2025-07-22 DIAGNOSIS — E11.65 TYPE 2 DIABETES MELLITUS WITH HYPERGLYCEMIA, WITHOUT LONG-TERM CURRENT USE OF INSULIN (H): ICD-10-CM

## 2025-07-22 DIAGNOSIS — Z87.2 HISTORY OF ABSCESS OF SKIN AND SUBCUTANEOUS TISSUE: ICD-10-CM

## 2025-07-22 DIAGNOSIS — S80.811A ABRASION OF RIGHT LOWER EXTREMITY, INITIAL ENCOUNTER: ICD-10-CM

## 2025-07-22 PROCEDURE — 3077F SYST BP >= 140 MM HG: CPT | Performed by: NURSE PRACTITIONER

## 2025-07-22 PROCEDURE — 3078F DIAST BP <80 MM HG: CPT | Performed by: NURSE PRACTITIONER

## 2025-07-22 PROCEDURE — 99214 OFFICE O/P EST MOD 30 MIN: CPT | Performed by: NURSE PRACTITIONER

## 2025-07-22 PROCEDURE — G2211 COMPLEX E/M VISIT ADD ON: HCPCS | Performed by: NURSE PRACTITIONER

## 2025-07-22 RX ORDER — DOXYCYCLINE 100 MG/1
100 TABLET ORAL 2 TIMES DAILY WITH MEALS
Qty: 14 TABLET | Refills: 0 | Status: SHIPPED | OUTPATIENT
Start: 2025-07-22 | End: 2025-07-29

## 2025-07-22 NOTE — PROGRESS NOTES
Assessment & Plan     Type 2 diabetes mellitus with hyperglycemia:  - Diabetes management is ongoing with metformin. Patient is not at full dosage yet. Declined any intervention today or did not really want to discuss.   - Continue with metformin. Monitor blood sugar levels. Follow-up with pharmacist for diabetes management.    Non-healing open wound of toe:  - Wound is not healing well, possibly due to diabetes.   - Referral to podiatry for further evaluation since non-healing and risk factors.  Start doxycycline  improves the condition.  - Risks and side effects: Take doxycycline with food to avoid nausea.    Uncontrolled diabetes mellitus with hyperglycemia:  - Diabetes is not fully controlled, patient is aware of the need for gradual management.  - Continue current management plan with metformin and pharmacist consultations. I offered labs, assistance with medications, etc and he declined.     History of abscess of skin and subcutaneous tissue:  - Previous staph infection on back has healed.    Dog scratch and abrasion of right lower leg:  - Patient sustained a scratch and abrasion to the right lower leg from playing with his dog. Area was cleaned with hydrogen peroxide and Neosporin. Redness present but no significant warmth or pain. Will monitor for signs of infection, especially given diabetes and slow healing.    Consent was obtained from the patient to use an AI documentation tool in the creation of this note.    The longitudinal plan of care for the diagnosis(es)/condition(s) as documented were addressed during this visit. Due to the added complexity in care, I will continue to support Mohsen in the subsequent management and with ongoing continuity of care.    Encounter Diagnoses   Name Primary?    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 Patient prefers to increase metformin gradually and is not using other diabetes medications at this time.  - History of staph infection on back, improved with current diabetes medication, required wound care and drainage, resolved after several weeks, recurred once, then resolved  - Chronic callus and protrusion under big toe for several months, worsened after wearing tight slip-on shoes without socks in early spring 2025  - Blister under big toe present for over a month, not healing, intermittently turns black, dead skin can be pulled away, scabbed over but underlying blood remains red/purplish, not open or bleeding  - Redness and swelling of big toe, more pronounced after use of Neosporin, mild pain (less than 1/10), no warmth to touch, no significant increase in redness  - Blister on bottom of foot developed after wearing tight shoes without socks, persists for over a month, mild pain with pressure, not warm to touch  - History of circulation issue in lower leg approximately 20 years ago, resolved after weight loss  - Reports diabetic neuropathy with decreased sensation in lower extremities, delayed pain perception  - No current fever, no signs of systemic infection reported  - No recent A1c check, follow-up with pharmacist scheduled for diabetes  "management  - Dog scratch and abrasion to right lower leg while playing with dog, redness noted, cleaned with hydrogen peroxide and Neosporin, no significant warmth or pain, patient concerned due to diabetes and slow healing    History of Present Illness       Reason for visit:  Injury  Symptoms include:  Redness and pain  Symptom intensity:  Mild  Symptom progression:  Staying the same  Had these symptoms before:  Yes  Has tried/received treatment for these symptoms:  No He is missing 1 dose(s) of medications per week.  He is not taking prescribed medications regularly due to other.                      Objective    BP (!) 159/77   Pulse 78   Resp 14   Ht 1.854 m (6' 1\")   Wt (!) 144.7 kg (319 lb)   SpO2 98%   BMI 42.09 kg/m    Body mass index is 42.09 kg/m .  Physical Exam  Constitutional:       Appearance: Normal appearance.   Musculoskeletal:      Right foot: Normal range of motion. No deformity.      Left foot: Normal range of motion. No deformity.        Feet:    Feet:      Right foot:      Skin integrity: No ulcer, erythema or warmth.      Toenail Condition: Right toenails are normal.      Left foot:      Skin integrity: Ulcer, skin breakdown, erythema and callus present. No warmth, dry skin or fissure.      Toenail Condition: Left toenails are normal.   Skin:     General: Skin is warm.      Capillary Refill: Capillary refill takes less than 2 seconds.          Neurological:      Mental Status: He is alert.                    Signed Electronically by: JOSE Maki CNP    "

## 2025-07-23 ENCOUNTER — PATIENT OUTREACH (OUTPATIENT)
Dept: CARE COORDINATION | Facility: CLINIC | Age: 45
End: 2025-07-23
Payer: COMMERCIAL